# Patient Record
Sex: MALE | Race: WHITE | NOT HISPANIC OR LATINO | Employment: OTHER | URBAN - METROPOLITAN AREA
[De-identification: names, ages, dates, MRNs, and addresses within clinical notes are randomized per-mention and may not be internally consistent; named-entity substitution may affect disease eponyms.]

---

## 2017-01-24 ENCOUNTER — ALLSCRIPTS OFFICE VISIT (OUTPATIENT)
Dept: OTHER | Facility: OTHER | Age: 77
End: 2017-01-24

## 2017-08-17 ENCOUNTER — GENERIC CONVERSION - ENCOUNTER (OUTPATIENT)
Dept: OTHER | Facility: OTHER | Age: 77
End: 2017-08-17

## 2018-02-20 RX ORDER — TAMSULOSIN HYDROCHLORIDE 0.4 MG/1
CAPSULE ORAL
COMMUNITY

## 2018-02-20 RX ORDER — CLOBETASOL PROPIONATE 0.5 MG/G
OINTMENT TOPICAL 2 TIMES DAILY
COMMUNITY
Start: 2014-01-17

## 2018-02-20 RX ORDER — ALPRAZOLAM 0.25 MG/1
TABLET ORAL
COMMUNITY
End: 2018-02-21 | Stop reason: ALTCHOICE

## 2018-02-20 RX ORDER — LISINOPRIL 20 MG/1
20 TABLET ORAL
COMMUNITY

## 2018-02-20 RX ORDER — TRIAMTERENE AND HYDROCHLOROTHIAZIDE 37.5; 25 MG/1; MG/1
TABLET ORAL
COMMUNITY
Start: 2014-01-17

## 2018-02-20 RX ORDER — INDOMETHACIN 25 MG/1
CAPSULE ORAL
COMMUNITY

## 2018-02-21 ENCOUNTER — OFFICE VISIT (OUTPATIENT)
Dept: DERMATOLOGY | Facility: CLINIC | Age: 78
End: 2018-02-21
Payer: MEDICARE

## 2018-02-21 DIAGNOSIS — Z85.828 HISTORY OF SKIN CANCER: ICD-10-CM

## 2018-02-21 DIAGNOSIS — L82.1 SEBORRHEIC KERATOSIS: Primary | ICD-10-CM

## 2018-02-21 DIAGNOSIS — L57.0 ACTINIC KERATOSIS: ICD-10-CM

## 2018-02-21 DIAGNOSIS — Z13.89 SCREENING FOR SKIN CONDITION: ICD-10-CM

## 2018-02-21 PROCEDURE — 99213 OFFICE O/P EST LOW 20 MIN: CPT | Performed by: DERMATOLOGY

## 2018-02-21 PROCEDURE — 17000 DESTRUCT PREMALG LESION: CPT | Performed by: DERMATOLOGY

## 2018-02-21 PROCEDURE — 17003 DESTRUCT PREMALG LES 2-14: CPT | Performed by: DERMATOLOGY

## 2018-02-21 NOTE — PATIENT INSTRUCTIONS
Actinic Keratosis:  Patient advised lesions are precancers  These should resolve with cryosurgery if not to let us know sun block recommended  Seborrheic keratosis patient reassured these are normal growths we acquire with age no treatment needed  History of skin cancer in no recurrence nothing else atypical sunblock recommended follow-up in 6 months  Screening for dermatologic disorders nothing else of concern noted on complete exam follow-up in 6 months  Treatment with Cryotherapy    The doctor has treated your skin with nitrogen, which is 320 degrees Fahrenheit below zero  He has given the treated area "frostbite "    Stinging should subside within a few hours  You can take Tylenol for pain, if needed  Over the next few days, it is normal if the area becomes reddened, a blood blister, or swollen with fluid  If the lesion treated was near the eye - you could get a swollen eye over the next few days  Do not panic! This is all temporary, and will resolve with time  There is no special treatment - just keep the area clean  Makeup and BandAids can be used, if preferred  When the area starts to dry up and peel off, using Vaseline can help healing  It usually takes up to a month for it to heal   Some lesions are recurrent and may require repeat treatments  If a lesion has not healed in one month, please don't hesitate to contact us  If you have any further questions that are not answered here, please call us  [phone]    Thank you for allowing us to care for you

## 2018-02-21 NOTE — PROGRESS NOTES
[address] [facility] OF [address] [facility] DERMATOLOGY  [address] [address]     MRN: [id number] : 1940  Encounter: [id number]  Patient Information: Juan R Marin  Chief complaint:Six-month skin checkup    History of present illness:  69-year-old male with history of previous skin cancers as well as actinic keratosis presents for overall checkup no specific complaints noted  No past medical history on file  No past surgical history on file  Social History   History   Alcohol use Not on file     History   Drug use: Unknown     History   Smoking Status    Not on file   Smokeless Tobacco    Not on file     No family history on file  Meds/Allergies   Allergies   Allergen Reactions    Other      Other reaction(s): Unknown       Meds:  Prior to Admission medications    Medication Sig Start Date End Date Taking?  Authorizing Provider   clobetasol (TEMOVATE) 0 05 % ointment Apply topically 2 (two) times a day 14  Yes Historical Provider, MD   triamcinolone (KENALOG) 0 1 % ointment Apply topically 2 (two) times a day 14  Yes Historical Provider, MD   triamterene-hydrochlorothiazide (MAXZIDE-25) 37 5-25 mg per tablet Take by mouth 14  Yes Historical Provider, MD   cholecalciferol (VITAMIN D3) 1,000 units tablet Take by mouth    Historical Provider, MD   indomethacin (INDOCIN) 25 mg capsule Take by mouth    Historical Provider, MD   lisinopril (ZESTRIL) 20 mg tablet Take by mouth    Historical Provider, MD   tamsulosin (FLOMAX) 0 4 mg Take by mouth    Historical Provider, MD   ALPRAZolam Karilyn Meline) 0 25 mg tablet Take by mouth  18  Historical Provider, MD       Subjective:     Review of Systems:    General: negative for - chills, fatigue, fever,  weight gain or weight loss  Psychological: negative for - anxiety, behavioral disorder, concentration difficulties, decreased libido, depression, irritability, memory difficulties, mood swings, sleep disturbances or suicidal ideation  ENT: negative for - hearing difficulties , nasal congestion, nasal discharge, oral lesions, sinus pain, sneezing, sore throat  Allergy and Immunology: negative for - hives, insect bite sensitivity,  Hematological and Lymphatic: negative for - bleeding problems, blood clots,bruising, swollen lymph nodes  Endocrine: negative for - hair pattern changes, hot flashes, malaise/lethargy, mood swings, palpitations, polydipsia/polyuria, skin changes, temperature intolerance or unexpected weight change  Respiratory: negative for - cough, hemoptysis, orthopnea, shortness of breath, or wheezing  Cardiovascular: negative for - chest pain, dyspnea on exertion, edema,  Gastrointestinal: negative for - abdominal pain, nausea/vomiting  Genito-Urinary: negative for - dysuria, incontinence, irregular/heavy menses or urinary frequency/urgency  Musculoskeletal: negative for - gait disturbance, joint pain, joint stiffness, joint swelling, muscle pain, muscular weakness  Dermatological:  As in HPI  Neurological: negative for confusion, dizziness, headaches, impaired coordination/balance, memory loss, numbness/tingling, seizures, speech problems, tremors or weakness       Objective: There were no vitals taken for this visit      Physical Exam:    General Appearance:    Alert, cooperative, no distress   Head:    Normocephalic, without obvious abnormality, atraumatic           Skin:   A full skin exam was performed including scalp, head scalp, eyes, ears, nose, lips, neck, chest, axilla, abdomen, back, buttocks, bilateral upper extremities, bilateral lower extremities, hands, feet, fingers, toes, fingernails, and toenails  Scaling hyperkeratotic areas noted below normal keratotic papules with greasy stuck on appearance previous sites of skin cancer well healed without recurrence nothing else atypical noted on complete exam   Physical Exam   HENT:   Head:          Cryotherapy Procedure Note    Pre-operative Diagnosis: actinic keratosis    Plan:  1  Instructed to keep the area dry and clean thereafter  Apply petrolatum if area gets crusty  2  Recommended that the patient use acetaminophen  as needed for pain  Locations: scalp and nose    Indications: Destruction of actinic keratosis x 4    Patient informed of risks (permanent scarring, infection, light or dark discoloration, bleeding, infection, weakness, numbness and recurrence of the lesion) and benefits of the procedure and verbal informed consent obtained  The areas are treated with liquid nitrogen therapy, frozen until ice ball extended 2 mm beyond lesion, allowed to thaw, and treated again  The patient tolerated procedure well  The patient was instructed on post-op care, warned that there may be blister formation, redness and pain  Recommend OTC analgesia as needed for pain  Condition:  Stable    Complications:  none  Assessment:     1  Seborrheic keratosis     2  Actinic keratosis     3  History of skin cancer     4  Screening for skin condition           Plan:   Actinic Keratosis:  Patient advised lesions are precancers  These should resolve with cryosurgery if not to let us know sun block recommended  Seborrheic keratosis patient reassured these are normal growths we acquire with age no treatment needed  History of skin cancer in no recurrence nothing else atypical sunblock recommended follow-up in 6 months  Screening for dermatologic disorders nothing else of concern noted on complete exam follow-up in 6 months  Lidia Rene MD  2/21/2018,3:24 PM    Portions of the record may have been created with voice recognition software   Occasional wrong word or "sound a like" substitutions may have occurred due to the inherent limitations of voice recognition software   Read the chart carefully and recognize, using context, where substitutions have occurred

## 2018-09-25 ENCOUNTER — OFFICE VISIT (OUTPATIENT)
Dept: DERMATOLOGY | Facility: CLINIC | Age: 78
End: 2018-09-25
Payer: MEDICARE

## 2018-09-25 DIAGNOSIS — L82.1 SEBORRHEIC KERATOSIS: ICD-10-CM

## 2018-09-25 DIAGNOSIS — L57.0 ACTINIC KERATOSIS: ICD-10-CM

## 2018-09-25 DIAGNOSIS — I87.2 STASIS DERMATITIS OF BOTH LEGS: ICD-10-CM

## 2018-09-25 DIAGNOSIS — Z85.828 HISTORY OF SKIN CANCER: Primary | ICD-10-CM

## 2018-09-25 DIAGNOSIS — Z13.89 SCREENING FOR SKIN CONDITION: ICD-10-CM

## 2018-09-25 PROCEDURE — 99213 OFFICE O/P EST LOW 20 MIN: CPT | Performed by: DERMATOLOGY

## 2018-09-25 PROCEDURE — 17003 DESTRUCT PREMALG LES 2-14: CPT | Performed by: DERMATOLOGY

## 2018-09-25 PROCEDURE — 17000 DESTRUCT PREMALG LESION: CPT | Performed by: DERMATOLOGY

## 2018-09-25 NOTE — PROGRESS NOTES
[facility] DERMATOLOGY  [address] [place] [address]  [phone]     MRN: [id number] : 1940  Encounter: [id number]  Patient Information: Jersey Lira  Chief complaint:Six-month check up    History of present illness:  20-year-old male with previous history of skin cancer actinic keratosis stasis dermatitis presents for overall checkup no specific concerns noted needs refills on his topical steroid for his leg  History reviewed  No pertinent past medical history  History reviewed  No pertinent surgical history  Social History   History   Alcohol Use    Yes     Comment: occasion     History   Drug Use No     History   Smoking Status    Never Smoker   Smokeless Tobacco    Never Used     History reviewed  No pertinent family history  Meds/Allergies   Allergies   Allergen Reactions    Other      Other reaction(s): Unknown       Meds:  Prior to Admission medications    Medication Sig Start Date End Date Taking?  Authorizing Provider   cholecalciferol (VITAMIN D3) 1,000 units tablet Take by mouth   Yes Historical Provider, MD   clobetasol (TEMOVATE) 0 05 % ointment Apply topically 2 (two) times a day 14  Yes Historical Provider, MD   indomethacin (INDOCIN) 25 mg capsule Take by mouth   Yes Historical Provider, MD   lisinopril (ZESTRIL) 20 mg tablet Take by mouth   Yes Historical Provider, MD   tamsulosin (FLOMAX) 0 4 mg Take by mouth   Yes Historical Provider, MD   triamcinolone (KENALOG) 0 1 % ointment Apply topically 2 (two) times a day 14  Yes Historical Provider, MD   triamterene-hydrochlorothiazide (MAXZIDE-25) 37 5-25 mg per tablet Take by mouth 14  Yes Historical Provider, MD       Subjective:     Review of Systems:    General: negative for - chills, fatigue, fever,  weight gain or weight loss  Psychological: negative for - anxiety, behavioral disorder, concentration difficulties, decreased libido, depression, irritability, memory difficulties, mood swings, sleep disturbances or suicidal ideation  ENT: negative for - hearing difficulties , nasal congestion, nasal discharge, oral lesions, sinus pain, sneezing, sore throat  Allergy and Immunology: negative for - hives, insect bite sensitivity,  Hematological and Lymphatic: negative for - bleeding problems, blood clots,bruising, swollen lymph nodes  Endocrine: negative for - hair pattern changes, hot flashes, malaise/lethargy, mood swings, palpitations, polydipsia/polyuria, skin changes, temperature intolerance or unexpected weight change  Respiratory: negative for - cough, hemoptysis, orthopnea, shortness of breath, or wheezing  Cardiovascular: negative for - chest pain, dyspnea on exertion, edema,  Gastrointestinal: negative for - abdominal pain, nausea/vomiting  Genito-Urinary: negative for - dysuria, incontinence, irregular/heavy menses or urinary frequency/urgency  Musculoskeletal: negative for - gait disturbance, joint pain, joint stiffness, joint swelling, muscle pain, muscular weakness  Dermatological:  As in HPI  Neurological: negative for confusion, dizziness, headaches, impaired coordination/balance, memory loss, numbness/tingling, seizures, speech problems, tremors or weakness       Objective: There were no vitals taken for this visit      Physical Exam:    General Appearance:    Alert, cooperative, no distress   Head:    Normocephalic, without obvious abnormality, atraumatic           Skin:   A full skin exam was performed including scalp, head scalp, eyes, ears, nose, lips, neck, chest, axilla, abdomen, back, buttocks, bilateral upper extremities, bilateral lower extremities, hands, feet, fingers, toes, fingernails, and toenails  Scaling erythematous areas noted on the scalp normal keratotic papules with greasy stuck on appearance previous sites of skin cancer well healed without recurrence hyperpigmentation in the lower legs with scaling noted  Physical Exam HENT:   Head:          Cryotherapy Procedure Note    Pre-operative Diagnosis: actinic keratosis    Plan:  1  Instructed to keep the area dry and clean thereafter  Apply petrolatum if area gets crusty  2  Recommended that the patient use acetaminophen  as needed for pain  Locations:   scalp    Indications: Destruction of  Actinic keratosis x4    Patient informed of risks (permanent scarring, infection, light or dark discoloration, bleeding, infection, weakness, numbness and recurrence of the lesion) and benefits of the procedure and verbal informed consent obtained  The areas are treated with liquid nitrogen therapy, frozen until ice ball extended 2 mm beyond lesion, allowed to thaw, and treated again  The patient tolerated procedure well  The patient was instructed on post-op care, warned that there may be blister formation, redness and pain  Recommend OTC analgesia as needed for pain  Condition:  Stable    Complications:  none  Assessment:     1  History of skin cancer     2  Actinic keratosis     3  Screening for skin condition     4  Seborrheic keratosis     5  Stasis dermatitis of both legs  triamcinolone (KENALOG) 0 1 % ointment         Plan:   Actinic Keratosis:  Patient advised lesions are precancers  These should resolve with cryosurgery if not to let us know sun block recommended     stasis dermatitis advise importance of support stockings along with a topical steroid  Seborrheic keratosis patient reassured these are normal growths we acquire with age no treatment needed  History of skin cancer in no recurrence nothing else atypical sunblock recommended follow-up in 1 year  Screening for dermatologic disorders nothing else of concern noted on complete exam follow-up in 1 year      Perez Pisano MD  9/25/2018,2:23 PM    Portions of the record may have been created with voice recognition software   Occasional wrong word or "sound a like" substitutions may have occurred due to the inherent limitations of voice recognition software   Read the chart carefully and recognize, using context, where substitutions have occurred

## 2018-09-25 NOTE — PATIENT INSTRUCTIONS
Actinic Keratosis:  Patient advised lesions are precancers  These should resolve with cryosurgery if not to let us know sun block recommended  stasis dermatitis advise importance of support stockings along with a topical steroid  Seborrheic keratosis patient reassured these are normal growths we acquire with age no treatment needed  History of skin cancer in no recurrence nothing else atypical sunblock recommended follow-up in 1 year  Screening for dermatologic disorders nothing else of concern noted on complete exam follow-up in 1 year  Treatment with Cryotherapy    The doctor has treated your skin with nitrogen, which is 320 degrees Fahrenheit below zero  He has given the treated area "frostbite "    Stinging should subside within a few hours  You can take Tylenol for pain, if needed  Over the next few days, it is normal if the area becomes reddened, a blood blister, or swollen with fluid  If the lesion treated was near the eye - you could get a swollen eye over the next few days  Do not panic! This is all temporary, and will resolve with time  There is no special treatment - just keep the area clean  Makeup and BandAids can be used, if preferred  When the area starts to dry up and peel off, using Vaseline can help healing  It usually takes up to a month for it to heal   Some lesions are recurrent and may require repeat treatments  If a lesion has not healed in one month, please don't hesitate to contact us  If you have any further questions that are not answered here, please call us  [phone]    Thank you for allowing us to care for you

## 2019-07-22 DIAGNOSIS — I87.2 STASIS DERMATITIS OF BOTH LEGS: ICD-10-CM

## 2019-10-08 ENCOUNTER — OFFICE VISIT (OUTPATIENT)
Dept: DERMATOLOGY | Facility: CLINIC | Age: 79
End: 2019-10-08
Payer: MEDICARE

## 2019-10-08 DIAGNOSIS — L57.0 ACTINIC KERATOSIS: ICD-10-CM

## 2019-10-08 DIAGNOSIS — Z85.828 HISTORY OF SKIN CANCER: ICD-10-CM

## 2019-10-08 DIAGNOSIS — L82.1 SEBORRHEIC KERATOSIS: Primary | ICD-10-CM

## 2019-10-08 DIAGNOSIS — Z13.89 SCREENING FOR SKIN CONDITION: ICD-10-CM

## 2019-10-08 PROCEDURE — 99213 OFFICE O/P EST LOW 20 MIN: CPT | Performed by: DERMATOLOGY

## 2019-10-08 PROCEDURE — 17003 DESTRUCT PREMALG LES 2-14: CPT | Performed by: DERMATOLOGY

## 2019-10-08 PROCEDURE — 17000 DESTRUCT PREMALG LESION: CPT | Performed by: DERMATOLOGY

## 2019-10-08 NOTE — PATIENT INSTRUCTIONS
Actinic Keratosis:  Patient advised lesions are precancers  These should resolve with cryosurgery if not to let us know sun block recommended  Seborrheic keratosis patient reassured these are normal growths we acquire with age no treatment needed  History of skin cancer in no recurrence nothing else atypical sunblock recommended follow-up in 12months  Screening for dermatologic disorders nothing else of concern noted on complete exam follow-up in 12 months  Treatment with Cryotherapy    The doctor has treated your skin with nitrogen, which is 320 degrees Fahrenheit below zero  He has given the treated area "frostbite "    Stinging should subside within a few hours  You can take Tylenol for pain, if needed  Over the next few days, it is normal if the area becomes reddened, a blood blister, or swollen with fluid  If the lesion treated was near the eye - you could get a swollen eye over the next few days  Do not panic! This is all temporary, and will resolve with time  There is no special treatment - just keep the area clean  Makeup and BandAids can be used, if preferred  When the area starts to dry up and peel off, using Vaseline can help healing  It usually takes up to a month for it to heal   Some lesions are recurrent and may require repeat treatments  If a lesion has not healed in one month, please don't hesitate to contact us  If you have any further questions that are not answered here, please call us  [phone]    Thank you for allowing us to care for you

## 2019-10-08 NOTE — PROGRESS NOTES
[address] [facility] DERMATOLOGY  [address] [place] [address]  [phone]  [phone]     MRN: [id number] : 1940  Encounter: [id number]  Patient Information: Cristine Carrion  Chief complaint:  Yearly checkup  History of present illness:  21-year-old male presents for overall checkup previous history of skin cancer continue have problems with the scalp no other concerns noted  History reviewed  No pertinent past medical history  History reviewed  No pertinent surgical history  Social History   Social History     Substance and Sexual Activity   Alcohol Use Yes    Comment: occasion     Social History     Substance and Sexual Activity   Drug Use No     Social History     Tobacco Use   Smoking Status Never Smoker   Smokeless Tobacco Never Used     History reviewed  No pertinent family history  Meds/Allergies   No Known Allergies    Meds:  Prior to Admission medications    Medication Sig Start Date End Date Taking?  Authorizing Provider   cholecalciferol (VITAMIN D3) 1,000 units tablet Take by mouth   Yes Historical Provider, MD   clobetasol (TEMOVATE) 0 05 % ointment Apply topically 2 (two) times a day 14  Yes Historical Provider, MD   lisinopril (ZESTRIL) 20 mg tablet Take 20 mg by mouth    Yes Historical Provider, MD   tamsulosin (FLOMAX) 0 4 mg Take by mouth   Yes Historical Provider, MD   triamcinolone (KENALOG) 0 1 % ointment Apply topically 2 (two) times a day To legs as needed 19  Yes Millicent Cid MD   indomethacin (INDOCIN) 25 mg capsule Take by mouth    Historical Provider, MD   triamterene-hydrochlorothiazide (MAXZIDE-25) 37 5-25 mg per tablet Take by mouth 14   Historical Provider, MD       Subjective:     Review of Systems:    General: negative for - chills, fatigue, fever,  weight gain or weight loss  Psychological: negative for - anxiety, behavioral disorder, concentration difficulties, decreased libido, depression, irritability, memory difficulties, mood swings, sleep disturbances or suicidal ideation  ENT: negative for - hearing difficulties , nasal congestion, nasal discharge, oral lesions, sinus pain, sneezing, sore throat  Allergy and Immunology: negative for - hives, insect bite sensitivity,  Hematological and Lymphatic: negative for - bleeding problems, blood clots,bruising, swollen lymph nodes  Endocrine: negative for - hair pattern changes, hot flashes, malaise/lethargy, mood swings, palpitations, polydipsia/polyuria, skin changes, temperature intolerance or unexpected weight change  Respiratory: negative for - cough, hemoptysis, orthopnea, shortness of breath, or wheezing  Cardiovascular: negative for - chest pain, dyspnea on exertion, edema,  Gastrointestinal: negative for - abdominal pain, nausea/vomiting  Genito-Urinary: negative for - dysuria, incontinence, irregular/heavy menses or urinary frequency/urgency  Musculoskeletal: negative for - gait disturbance, joint pain, joint stiffness, joint swelling, muscle pain, muscular weakness  Dermatological:  As in HPI  Neurological: negative for confusion, dizziness, headaches, impaired coordination/balance, memory loss, numbness/tingling, seizures, speech problems, tremors or weakness       Objective: There were no vitals taken for this visit      Physical Exam:    General Appearance:    Alert, cooperative, no distress   Head:    Normocephalic, without obvious abnormality, atraumatic           Skin:   A full skin exam was performed including scalp, head scalp, eyes, ears, nose, lips, neck, chest, axilla, abdomen, back, buttocks, bilateral upper extremities, bilateral lower extremities, hands, feet, fingers, toes, fingernails, and toenails scaling erythematous areas noted below normal keratotic papules with greasy stuck on appearance previous sites skin cancer well healed without recurrence  Physical Exam   HENT:   Head:          Cryotherapy Procedure Note    Pre-operative Diagnosis: actinic keratosis    Plan:  1  Instructed to keep the area dry and clean thereafter  Apply petrolatum if area gets crusty  2  Recommended that the patient use acetaminophen  as needed for pain  Locations:  Scalp    Indications: Destruction of actinic keratosis x4  Patient informed of risks (permanent scarring, infection, light or dark discoloration, bleeding, infection, weakness, numbness and recurrence of the lesion) and benefits of the procedure and verbal informed consent obtained  The areas are treated with liquid nitrogen therapy, frozen until ice ball extended 2 mm beyond lesion, allowed to thaw, and treated again  The patient tolerated procedure well  The patient was instructed on post-op care, warned that there may be blister formation, redness and pain  Recommend OTC analgesia as needed for pain  Condition:  Stable    Complications:  none  Assessment:     1  Seborrheic keratosis     2  Screening for skin condition     3  History of skin cancer     4  Actinic keratosis           Plan:   Actinic Keratosis:  Patient advised lesions are precancers  These should resolve with cryosurgery if not to let us know sun block recommended  Seborrheic keratosis patient reassured these are normal growths we acquire with age no treatment needed  History of skin cancer in no recurrence nothing else atypical sunblock recommended follow-up in 12months  Screening for dermatologic disorders nothing else of concern noted on complete exam follow-up in 12 months    Leonor Enriquez MD  10/8/2019,2:40 PM    Portions of the record may have been created with voice recognition software   Occasional wrong word or "sound a like" substitutions may have occurred due to the inherent limitations of voice recognition software   Read the chart carefully and recognize, using context, where substitutions have occurred

## 2020-05-04 DIAGNOSIS — I87.2 STASIS DERMATITIS OF BOTH LEGS: ICD-10-CM

## 2021-02-25 DIAGNOSIS — I87.2 STASIS DERMATITIS OF BOTH LEGS: ICD-10-CM

## 2021-04-21 ENCOUNTER — OFFICE VISIT (OUTPATIENT)
Dept: DERMATOLOGY | Facility: CLINIC | Age: 81
End: 2021-04-21
Payer: COMMERCIAL

## 2021-04-21 VITALS — TEMPERATURE: 97.2 F

## 2021-04-21 DIAGNOSIS — D48.9 NEOPLASM OF UNCERTAIN BEHAVIOR: ICD-10-CM

## 2021-04-21 DIAGNOSIS — Z85.828 HISTORY OF SKIN CANCER: ICD-10-CM

## 2021-04-21 DIAGNOSIS — L82.1 SEBORRHEIC KERATOSIS: ICD-10-CM

## 2021-04-21 DIAGNOSIS — L98.9 UNKNOWN SKIN LESION: Primary | ICD-10-CM

## 2021-04-21 DIAGNOSIS — Z13.89 SCREENING FOR SKIN CONDITION: ICD-10-CM

## 2021-04-21 DIAGNOSIS — I87.2 STASIS DERMATITIS OF BOTH LEGS: ICD-10-CM

## 2021-04-21 PROCEDURE — 88305 TISSUE EXAM BY PATHOLOGIST: CPT | Performed by: STUDENT IN AN ORGANIZED HEALTH CARE EDUCATION/TRAINING PROGRAM

## 2021-04-21 PROCEDURE — 99213 OFFICE O/P EST LOW 20 MIN: CPT | Performed by: DERMATOLOGY

## 2021-04-21 PROCEDURE — 11102 TANGNTL BX SKIN SINGLE LES: CPT | Performed by: DERMATOLOGY

## 2021-04-21 NOTE — PROGRESS NOTES
[address]  [address] [facility]  [place] [address]  [phone]  [phone]     MRN: [id number] : 1940  Encounter: [id number]  Patient Information: Anika Dixon  Chief complaint: yearly check up    History of present illness:  67-year-old male presents for overall skin check history of stasis dermatitis notes of rash that is present on the corner of his eye has been using the triamcinolone on that area couple times a week  No other concerns noted  History reviewed  No pertinent past medical history  History reviewed  No pertinent surgical history  Social History   Social History     Substance and Sexual Activity   Alcohol Use Yes    Comment: occasion     Social History     Substance and Sexual Activity   Drug Use No     Social History     Tobacco Use   Smoking Status Never Smoker   Smokeless Tobacco Never Used     History reviewed  No pertinent family history  Meds/Allergies   No Known Allergies    Meds:  Prior to Admission medications    Medication Sig Start Date End Date Taking?  Authorizing Provider   cholecalciferol (VITAMIN D3) 1,000 units tablet Take by mouth   Yes Historical Provider, MD   clobetasol (TEMOVATE) 0 05 % ointment Apply topically 2 (two) times a day 14  Yes Historical Provider, MD   indomethacin (INDOCIN) 25 mg capsule Take by mouth   Yes Historical Provider, MD   lisinopril (ZESTRIL) 20 mg tablet Take 20 mg by mouth    Yes Historical Provider, MD   tamsulosin (FLOMAX) 0 4 mg Take by mouth   Yes Historical Provider, MD   triamcinolone (KENALOG) 0 1 % ointment apply topically twice a day TO LEGS AS NEEDED  21  Yes Lou Rodriguez MD   triamterene-hydrochlorothiazide [facility]) 37 5-25 mg per tablet Take by mouth 14  Yes Historical Provider, MD       Subjective:     Review of Systems:    General: negative for - chills, fatigue, fever,  weight gain or weight loss  Psychological: negative for - anxiety, behavioral disorder, concentration difficulties, decreased libido, depression, irritability, memory difficulties, mood swings, sleep disturbances or suicidal ideation  ENT: negative for - hearing difficulties , nasal congestion, nasal discharge, oral lesions, sinus pain, sneezing, sore throat  Allergy and Immunology: negative for - hives, insect bite sensitivity,  Hematological and Lymphatic: negative for - bleeding problems, blood clots,bruising, swollen lymph nodes  Endocrine: negative for - hair pattern changes, hot flashes, malaise/lethargy, mood swings, palpitations, polydipsia/polyuria, skin changes, temperature intolerance or unexpected weight change  Respiratory: negative for - cough, hemoptysis, orthopnea, shortness of breath, or wheezing  Cardiovascular: negative for - chest pain, dyspnea on exertion, edema,  Gastrointestinal: negative for - abdominal pain, nausea/vomiting  Genito-Urinary: negative for - dysuria, incontinence, irregular/heavy menses or urinary frequency/urgency  Musculoskeletal: negative for - gait disturbance, joint pain, joint stiffness, joint swelling, muscle pain, muscular weakness  Dermatological:  As in HPI  Neurological: negative for confusion, dizziness, headaches, impaired coordination/balance, memory loss, numbness/tingling, seizures, speech problems, tremors or weakness       Objective:   Temp (!) 97 2 °F (36 2 °C) (Temporal)     Physical Exam:    General Appearance:    Alert, cooperative, no distress   Head:    Normocephalic, without obvious abnormality, atraumatic           Skin:   A full skin exam was performed including scalp, head scalp, eyes, ears, nose, lips, neck, chest, axilla, abdomen, back, buttocks, bilateral upper extremities, bilateral lower extremities, hands, feet, fingers, toes, fingernails, and toenails 23 mm scaling erythematous patch noted on the left infraorbital area into the nasal bridge normal keratotic papules greasy stuck on appearance previous sites skin cancer well healed without recurrence some scaling patches noted on the back with his hyperpigmentation and swelling noted on both legs no active dermatitis on the legs noted nothing else of concern noted exam    Shave Biopsy Procedure Note    Pre-operative Diagnosis:  Basal cell carcinoma    Plan:  1  Instructed to keep the wound dry and covered for 24 and clean thereafter  2  Warning signs of infection were reviewed  3  Recommended that the patient use OTC acetaminophen as needed for pain  4  Return  Pending results of biopsy(ies)    Locations:  Infraorbital area    Indications:  Suspicious lesion    Anesthesia: Lidocaine 1% with epinephrine without added sodium bicarbonate    Procedure Details     Patient informed of the risks (including bleeding and infection) and benefits of the   procedure and Verbal informed consent obtained  The lesion and surrounding area were given a sterile prep using alcohol and draped in the usual sterile fashion  A Blue blade razor was used to obtain a specimen  Hemostasis achieved with aluminum chloride  Petrolatum and a sterile dressing applied  The specimen was sent for pathologic examination  The patient tolerated the procedure(s) well  Complications:  none  Assessment:     1  Unknown skin lesion     2  Seborrheic keratosis     3  Screening for skin condition     4  History of skin cancer     5   Stasis dermatitis of both legs           Plan:   Skin lesion possible basal cell carcinoma because of its large size would require Mohs surgery however patient will be planning to go [place] July [place] daughter's wedding will need to consider if we can get this healed up before then  Stasis dermatitis appears stable continue with the topical steroid as needed  Seborrheic keratosis patient reassured these are normal growths we acquire with age no treatment needed  History of skin cancer in no recurrence nothing else atypical sunblock recommended follow-up in 1 year  Screening for dermatologic disorders nothing else of concern noted on complete exam follow-up in 1 year      Sameer Lehman MD  4/21/2021,2:17 PM    Portions of the record may have been created with voice recognition software   Occasional wrong word or "sound a like" substitutions may have occurred due to the inherent limitations of voice recognition software   Read the chart carefully and recognize, using context, where substitutions have occurred

## 2021-04-21 NOTE — PATIENT INSTRUCTIONS
Skin lesion possible basal cell carcinoma because of its large size would require Mohs surgery however patient will be planning to go [place] daughter[place] will need to consider if we can get this healed up before then  Stasis dermatitis appears stable continue with the topical steroid as needed  Seborrheic keratosis patient reassured these are normal growths we acquire with age no treatment needed  History of skin cancer in no recurrence nothing else atypical sunblock recommended follow-up in 1 year  Screening for dermatologic disorders nothing else of concern noted on complete exam follow-up in 1 year  Wound care instructions given to patient

## 2021-04-27 DIAGNOSIS — C44.329 SQUAMOUS CELL CANCER OF SKIN OF LEFT CHEEK: Primary | ICD-10-CM

## 2021-04-27 DIAGNOSIS — C44.329 SQUAMOUS CELL CANCER OF SKIN OF RIGHT CHEEK: ICD-10-CM

## 2021-07-07 ENCOUNTER — TELEPHONE (OUTPATIENT)
Dept: DERMATOLOGY | Facility: CLINIC | Age: 81
End: 2021-07-07

## 2021-07-07 NOTE — TELEPHONE ENCOUNTER
Telephone call to pt regarding coordinating MOHS with Dr Drema Osgood plastics closure  Dates available are 8/25 & 8/26  Unable to leave message due to mailbox being full  Will try back at a later date

## 2021-07-08 NOTE — TELEPHONE ENCOUNTER
Telephone call to pt  LVM regarding scheduling MOHS with Dr Gayla Fishman closure  Advised to return my call at earliest convenience

## 2021-07-09 NOTE — TELEPHONE ENCOUNTER
Telephone call from pt regarding coordinating MOHS with plastics  Pt needs to check with daughters schedule then will call me to confirm if dates 8/25 & 8/26 work or not

## 2021-08-17 NOTE — TELEPHONE ENCOUNTER
Called and spoke with patient  He is unable to get Mohs until after September 20th  Patient has to have a procedure on his bladder and has to wear a catheter for a month  He said he will reach out when he is cleared

## 2021-12-08 ENCOUNTER — TELEPHONE (OUTPATIENT)
Dept: DERMATOLOGY | Facility: CLINIC | Age: 81
End: 2021-12-08

## 2021-12-08 ENCOUNTER — PROCEDURE VISIT (OUTPATIENT)
Dept: DERMATOLOGY | Facility: CLINIC | Age: 81
End: 2021-12-08
Payer: COMMERCIAL

## 2021-12-08 VITALS
WEIGHT: 227.4 LBS | DIASTOLIC BLOOD PRESSURE: 82 MMHG | SYSTOLIC BLOOD PRESSURE: 138 MMHG | HEART RATE: 91 BPM | TEMPERATURE: 97.2 F | RESPIRATION RATE: 16 BRPM

## 2021-12-08 DIAGNOSIS — C44.92 SCC (SQUAMOUS CELL CARCINOMA): ICD-10-CM

## 2021-12-08 DIAGNOSIS — C44.329 SQUAMOUS CELL CANCER OF SKIN OF LEFT CHEEK: ICD-10-CM

## 2021-12-08 PROCEDURE — 17311 MOHS 1 STAGE H/N/HF/G: CPT | Performed by: DERMATOLOGY

## 2021-12-08 PROCEDURE — 17312 MOHS ADDL STAGE: CPT | Performed by: DERMATOLOGY

## 2021-12-09 ENCOUNTER — TELEPHONE (OUTPATIENT)
Dept: DERMATOLOGY | Facility: CLINIC | Age: 81
End: 2021-12-09

## 2021-12-14 ENCOUNTER — TELEPHONE (OUTPATIENT)
Dept: HEMATOLOGY ONCOLOGY | Facility: CLINIC | Age: 81
End: 2021-12-14

## 2021-12-28 ENCOUNTER — TELEPHONE (OUTPATIENT)
Dept: HEMATOLOGY ONCOLOGY | Facility: CLINIC | Age: 81
End: 2021-12-28

## 2022-01-07 DIAGNOSIS — I87.2 STASIS DERMATITIS OF BOTH LEGS: ICD-10-CM

## 2022-03-14 ENCOUNTER — OFFICE VISIT (OUTPATIENT)
Dept: DERMATOLOGY | Facility: CLINIC | Age: 82
End: 2022-03-14
Payer: COMMERCIAL

## 2022-03-14 DIAGNOSIS — Z51.89 VISIT FOR WOUND CHECK: Primary | ICD-10-CM

## 2022-03-14 PROCEDURE — 99212 OFFICE O/P EST SF 10 MIN: CPT | Performed by: DERMATOLOGY

## 2022-03-14 NOTE — PROGRESS NOTES
WOUND CHECK    Physical Exam:   Anatomic Location Affected:  Left infraorbital area   Description of wound: Well healed graft with slight edema under left eye   Closure Type: Referred to plastics - Dr Kacey Antony     Additional History of Present Condition:  S/P MOHS on 12/08/2021  Patient had repair done by Dr Kacey Antony  Patient satisfied with overall take of graft although has a slight bump underneath left eye which he states drainage comes out at times  Patient also states clear fluid drains out of left corner of left eye  Patient had follow-up appointment with Dr Kacey Antony 2 months ago at which point Dr Kacey Antony stated everything was looking fine  Assessment and Plan:  Based on a thorough discussion of this condition and the management approach to it (including a comprehensive discussion of the known risks, side effects and potential benefits of treatment), the patient (family) agrees to implement the following specific plan:   Advised clear drainage out of left eye is normal  Would not do any intervention at this point since MOHS was only done early December   Patient has follow up appointment with Dr Kacey Antony on 04/07/2022  Patient returns for wound check s/p mohs and oculoplastics repair  He states he will get some drainage from his lateral eyelid and medial cheek  I discussed that cysts can form in scars  At this time there is no signs of recurrence but he was instructed to see hematology oncology due to the aggressive nature of his SCC with large size and perineural invasion  He has yet to see hematology oncology despite this  He will see Dr Kacey Antony next month and discussed that he scar is healing well and will continue to heal months from now  He is instructed to follow up with dermatology for a FBSE given his SCC history      Scribe Attestation    I,:  Janice Aquino RN am acting as a scribe while in the presence of the attending physician :       I,:  Jana Auguste MD personally performed the services described in this documentation    as scribed in my presence :

## 2022-06-09 PROCEDURE — 88305 TISSUE EXAM BY PATHOLOGIST: CPT | Performed by: PATHOLOGY

## 2022-06-09 PROCEDURE — 88341 IMHCHEM/IMCYTCHM EA ADD ANTB: CPT | Performed by: PATHOLOGY

## 2022-06-09 PROCEDURE — 88342 IMHCHEM/IMCYTCHM 1ST ANTB: CPT | Performed by: PATHOLOGY

## 2022-06-10 ENCOUNTER — LAB REQUISITION (OUTPATIENT)
Dept: LAB | Facility: HOSPITAL | Age: 82
End: 2022-06-10
Payer: COMMERCIAL

## 2022-06-10 DIAGNOSIS — H02.115 CICATRICIAL ECTROPION OF LEFT LOWER EYELID: ICD-10-CM

## 2022-07-25 ENCOUNTER — HOSPITAL ENCOUNTER (OUTPATIENT)
Dept: CT IMAGING | Facility: CLINIC | Age: 82
Discharge: HOME | End: 2022-07-25
Payer: COMMERCIAL

## 2022-07-25 DIAGNOSIS — C44.329 SQUAMOUS CELL CARCINOMA OF SKIN OF OTHER PARTS OF FACE: ICD-10-CM

## 2022-07-25 DIAGNOSIS — C44.1291 SQUAMOUS CELL CARCINOMA OF SKIN OF LEFT UPPER EYELID, INCLUDING CANTHUS: ICD-10-CM

## 2022-07-25 PROCEDURE — G1004 CDSM NDSC: HCPCS

## 2022-07-25 PROCEDURE — 70487 CT MAXILLOFACIAL W/DYE: CPT

## 2022-07-25 PROCEDURE — 70491 CT SOFT TISSUE NECK W/DYE: CPT

## 2022-07-25 RX ADMIN — IOHEXOL 75 ML: 350 INJECTION, SOLUTION INTRAVENOUS at 10:49

## 2022-08-02 ENCOUNTER — OFFICE VISIT (OUTPATIENT)
Dept: DERMATOLOGY | Facility: CLINIC | Age: 82
End: 2022-08-02
Payer: COMMERCIAL

## 2022-08-02 DIAGNOSIS — Z51.89 VISIT FOR WOUND CHECK: Primary | ICD-10-CM

## 2022-08-02 DIAGNOSIS — C44.92 RECURRENT SQUAMOUS CELL CARCINOMA OF SKIN: ICD-10-CM

## 2022-08-02 PROCEDURE — 99213 OFFICE O/P EST LOW 20 MIN: CPT | Performed by: DERMATOLOGY

## 2022-08-05 NOTE — PROGRESS NOTES
WOUND CHECK MOHS     Physical Exam:   Anatomic Location Affected:  Left infraorbital area    Description of wound: well healed graft with tear duct drainage    Closure Type: Dr soto performed closure   Case Report   Surgical Pathology Report                         Case: [id number]                                    Authorizing Provider: Janes Ring MD          Collected:           06/09/2022 0000               Ordering Location:     [address]      Received:            06/10/2022 [address] [place] [address] Specialty                                                                                   Laboratory                                                                    Pathologist:           Kaylyn Torres MD                                                              Specimen:    Skin, Other, left upper lid margin lesion                                                  Final Diagnosis   A  Skin, left upper lid margin, biopsy:  Dermal atypical squamous proliferation with prominent procedural artifact  See note         Note:  The lesion exhibits cytologic atypia with prominent crushing artifact, and is most consistent with squamous cell carcinoma       Immunohistochemical stains for SOX10, HMB45, pancytokeratin AE1/AE3, EMA, BerEp4 and MOC31 performed with adequate controls support the findings  Deeper levels were examined  CT FACIAL SOFT TISSUES WITH INTRAVENOUS CONTRAST     INDICATION:   B75 [id number]: Squamous cell carcinoma of skin of left upper eyelid, including canthus  C44 329: Squamous cell carcinoma of skin of other parts of face  History of MOHS and skin graft reconstruction       COMPARISON: Concurrent CT neck  No prior study for comparison     TECHNIQUE:  Axial images through the facial soft tissues were performed  Reformatted images were created in multiple planes        Radiation dose length product (DLP) for this visit:  0 mGy-cm     This examination, like all CT scans performed in the [facility], was performed utilizing techniques to minimize radiation dose exposure, including the use of iterative   reconstruction and automated exposure control      IV Contrast:  75 mL of iohexol (OMNIPAQUE)     IMAGE QUALITY:  Diagnostic      FINDINGS:      SOFT TISSUES: Soft tissue thickening in the left medial canthal region extending anteromedially along the frontal process of the maxilla measuring approximately 2 x 0 7 cm  Small hypodense component at the posterior margin  Findings could represent   posttreatment change but residual/recurrent neoplasm cannot be excluded  Left extraocular muscles are otherwise unremarkable  Right orbit is unremarkable      DENTITION: Dental caries and radicular cysts      FACIAL BONES: There is no facial bone fracture identified  No discrete lytic or blastic lesion  No destructive lesions      ORBITS: Soft tissue in the left medial canthal region as described  Posterior aspect of the soft tissue is inseparable from the anterior inferior margin of the left superior oblique muscle which is otherwise unremarkable  Left orbit is otherwise   unremarkable       Right orbit is unremarkable      SINUSES: Minimal maxillary sinus mucosal thickening      IMPRESSION:     Soft tissue thickening in the left medial canthal region that may represent posttreatment change but residual/recurrent disease is not excluded       The study was marked in EPIC for significant notification          Additional History of Present Condition:  Patient states he was unable to see Cristian Ferreira for wound check since we was not in the office  Patient states he continues to have drainage from the corner of his eye  Patient had CT scan done due to the results of the biopsy that was done on 06/09/20220 which showed dermal atypical squamous proliferation       Assessment and Plan:  Based on a thorough discussion of this condition and the management approach to it (including a comprehensive discussion of the known risks, side effects and potential benefits of treatment), the patient (family) agrees to implement the following specific plan:   It was discussed with patient that he does need another MOHS procedure    Will discuss with Dr Mckeon weather sending patient to [address] [place] [address] would be his best option      08/04/2022  It was confirmed patient will be going to [address] [place] [address] for MOHS, records will be faxed so patient can be scheduled      Scribe Attestation    I,:  Jak Nation am acting as a scribe while in the presence of the attending physician :       I,:  Juan Serrano MD personally performed the services described in this documentation    as scribed in my presence :

## 2022-09-21 ENCOUNTER — OFFICE VISIT (OUTPATIENT)
Dept: DERMATOLOGY | Facility: CLINIC | Age: 82
End: 2022-09-21
Payer: COMMERCIAL

## 2022-09-21 VITALS — WEIGHT: 227 LBS | HEIGHT: 72 IN | BODY MASS INDEX: 30.75 KG/M2

## 2022-09-21 DIAGNOSIS — I87.2 STASIS DERMATITIS OF BOTH LEGS: ICD-10-CM

## 2022-09-21 DIAGNOSIS — L57.0 ACTINIC KERATOSIS: ICD-10-CM

## 2022-09-21 DIAGNOSIS — Z13.89 SCREENING FOR SKIN CONDITION: ICD-10-CM

## 2022-09-21 DIAGNOSIS — C44.92 RECURRENT SQUAMOUS CELL CARCINOMA OF SKIN: Primary | ICD-10-CM

## 2022-09-21 DIAGNOSIS — L82.1 SEBORRHEIC KERATOSIS: ICD-10-CM

## 2022-09-21 DIAGNOSIS — Z85.828 HISTORY OF SKIN CANCER: ICD-10-CM

## 2022-09-21 PROCEDURE — 99214 OFFICE O/P EST MOD 30 MIN: CPT | Performed by: DERMATOLOGY

## 2022-09-21 NOTE — PATIENT INSTRUCTIONS
Recurrence squamous cell patient hopefully will get good results with his repeat procedure on did side not to intervene with a hypertrophic actinic at this time with his surgery planned for October 10th    In addition on will have patient come back in 3 months to re-evaluate  Stasis dermatitis will refill his triamcinolone to control this process  Seborrheic keratosis patient reassured these are normal growths we acquire with age no treatment needed  History of skin cancer in no recurrence nothing else atypical sunblock recommended follow-up in 3 months  Screening for dermatologic disorders nothing else of concern noted on complete exam follow-up in 3 months

## 2022-09-21 NOTE — PROGRESS NOTES
[address]  [address] [facility] [address]  [phone]  [phone]     MRN: [id number] : 1940  Encounter: [id number]  Patient Information: Martha Moore  Chief complaint:  Skin Check    History of present illness:  81-year-old male with previous noted squamous cell carcinoma of the left infraorbital area with Mohs surgery performed with recurrence noted by Dr Kelly Diaz  Patient will be seen by Dr Dank Cruz at [facility] for repeat Mohs and planned repair  No other concerns noted except some lesions scalp and continued problems with the stasis dermatitis noted previously  Past Medical History:   Diagnosis Date    Squamous cell skin cancer 2021    SCC- infraorbital area     Past Surgical History:   Procedure Laterality Date    MOHS SURGERY Left 2021    SCC- Left infraorbital area, ben     Social History   Social History     Substance and Sexual Activity   Alcohol Use Yes    Comment: occasion     Social History     Substance and Sexual Activity   Drug Use No     Social History     Tobacco Use   Smoking Status Never Smoker   Smokeless Tobacco Never Used     No family history on file  Meds/Allergies   No Known Allergies    Meds:  Prior to Admission medications    Medication Sig Start Date End Date Taking?  Authorizing Provider   cholecalciferol (VITAMIN D3) 1,000 units tablet Take by mouth   Yes Historical Provider, MD   clobetasol (TEMOVATE) 0 05 % ointment Apply topically 2 (two) times a day 14  Yes Historical Provider, MD   indomethacin (INDOCIN) 25 mg capsule Take by mouth   Yes Historical Provider, MD   lisinopril (ZESTRIL) 20 mg tablet Take 20 mg by mouth    Yes Historical Provider, MD   tamsulosin (FLOMAX) 0 4 mg Take by mouth   Yes Historical Provider, MD   triamcinolone (KENALOG) 0 1 % ointment APPLY TOPICALLY TWICE A DAY TO LEGS AS NEEDED 22  Yes Sravan Jain MD triamterene-hydrochlorothiazide (MAXZIDE-25) 37 5-25 mg per tablet Take by mouth 1/17/14  Yes Historical Provider, MD       Subjective:     Review of Systems:    General: negative for - chills, fatigue, fever,  weight gain or weight loss  Psychological: negative for - anxiety, behavioral disorder, concentration difficulties, decreased libido, depression, irritability, memory difficulties, mood swings, sleep disturbances or suicidal ideation  ENT: negative for - hearing difficulties , nasal congestion, nasal discharge, oral lesions, sinus pain, sneezing, sore throat  Allergy and Immunology: negative for - hives, insect bite sensitivity,  Hematological and Lymphatic: negative for - bleeding problems, blood clots,bruising, swollen lymph nodes  Endocrine: negative for - hair pattern changes, hot flashes, malaise/lethargy, mood swings, palpitations, polydipsia/polyuria, skin changes, temperature intolerance or unexpected weight change  Respiratory: negative for - cough, hemoptysis, orthopnea, shortness of breath, or wheezing  Cardiovascular: negative for - chest pain, dyspnea on exertion, edema,  Gastrointestinal: negative for - abdominal pain, nausea/vomiting  Genito-Urinary: negative for - dysuria, incontinence, irregular/heavy menses or urinary frequency/urgency  Musculoskeletal: negative for - gait disturbance, joint pain, joint stiffness, joint swelling, muscle pain, muscular weakness  Dermatological:  As in HPI  Neurological: negative for confusion, dizziness, headaches, impaired coordination/balance, memory loss, numbness/tingling, seizures, speech problems, tremors or weakness       Objective:   Ht 6' (1 829 m)   Wt 103 kg (227 lb)   BMI 30 79 kg/m²     Physical Exam:    General Appearance:    Alert, cooperative, no distress   Head:    Normocephalic, without obvious abnormality, atraumatic           Skin:   A full skin exam was performed including scalp, head scalp, eyes, ears, nose, lips, neck, chest, axilla, abdomen, back, buttocks, bilateral upper extremities, bilateral lower extremities, hands, feet, fingers, toes, fingernails, and toenails hyperkeratotic areas noted on the scalp times 5 previous site of excision on the left infra orbital area will involvement still noted  Inflammation swelling with discoloration noted on the lower extremities normal keratotic papules with greasy stuck on appearance nothing else atypical noted on exam     Assessment:     1  Recurrent squamous cell carcinoma of skin     2  Actinic keratosis     3  Seborrheic keratosis     4  Stasis dermatitis of both legs     5  Screening for skin condition     6  History of skin cancer           Plan:   Recurrence squamous cell patient hopefully will get good results with his repeat procedure on did side not to intervene with a hypertrophic actinic at this time with his surgery planned for October 10th  In addition on will have patient come back in 3 months to re-evaluate  Stasis dermatitis will refill his triamcinolone to control this process  Seborrheic keratosis patient reassured these are normal growths we acquire with age no treatment needed  History of skin cancer in no recurrence nothing else atypical sunblock recommended follow-up in 3 months  Screening for dermatologic disorders nothing else of concern noted on complete exam follow-up in 3 months    Opal Yun MD  9/21/2022,3:19 PM    Portions of the record may have been created with voice recognition software   Occasional wrong word or "sound a like" substitutions may have occurred due to the inherent limitations of voice recognition software   Read the chart carefully and recognize, using context, where substitutions have occurred

## 2022-11-28 ENCOUNTER — TELEPHONE (OUTPATIENT)
Dept: DERMATOLOGY | Facility: CLINIC | Age: 82
End: 2022-11-28

## 2023-07-24 ENCOUNTER — TELEPHONE (OUTPATIENT)
Dept: DERMATOLOGY | Facility: CLINIC | Age: 83
End: 2023-07-24

## 2023-07-24 DIAGNOSIS — I87.2 STASIS DERMATITIS OF BOTH LEGS: ICD-10-CM

## 2023-07-24 RX ORDER — TRIAMCINOLONE ACETONIDE 1 MG/G
OINTMENT TOPICAL
Qty: 80 G | Refills: 3 | Status: SHIPPED | OUTPATIENT
Start: 2023-07-24 | End: 2023-08-10 | Stop reason: SDUPTHER

## 2023-07-24 NOTE — TELEPHONE ENCOUNTER
Rec'd call from patients daughter Moira Diaz [phone] stating that her father has been staying down with her - due to skin cancer treatments. He will be in [facility] week of 8/7 - she was trying to schedule a "regular" derm appt with Dr. Swetha Rausch. I informed her at this time - nothing available but I would call her with any cancellations. If not, she states, patient will be seen next Spring when hopefully he's residing again in [facility]. Requesting a refill of KENALOG be sent to [facility] in [place], [place] (I added to patients chart).

## 2023-08-10 ENCOUNTER — OFFICE VISIT (OUTPATIENT)
Age: 83
End: 2023-08-10
Payer: COMMERCIAL

## 2023-08-10 VITALS — BODY MASS INDEX: 32.18 KG/M2 | WEIGHT: 205 LBS | HEIGHT: 67 IN

## 2023-08-10 DIAGNOSIS — L82.1 SEBORRHEIC KERATOSIS: ICD-10-CM

## 2023-08-10 DIAGNOSIS — D18.01 CHERRY ANGIOMA: ICD-10-CM

## 2023-08-10 DIAGNOSIS — Z13.89 SCREENING FOR SKIN CONDITION: Primary | ICD-10-CM

## 2023-08-10 DIAGNOSIS — I87.2 STASIS DERMATITIS OF BOTH LEGS: ICD-10-CM

## 2023-08-10 DIAGNOSIS — D48.9 NEOPLASM OF UNCERTAIN BEHAVIOR: ICD-10-CM

## 2023-08-10 DIAGNOSIS — L30.9 DERMATITIS: ICD-10-CM

## 2023-08-10 DIAGNOSIS — D22.9 MULTIPLE NEVI: ICD-10-CM

## 2023-08-10 DIAGNOSIS — Z85.828 HISTORY OF SKIN CANCER: ICD-10-CM

## 2023-08-10 PROCEDURE — 99214 OFFICE O/P EST MOD 30 MIN: CPT | Performed by: DERMATOLOGY

## 2023-08-10 PROCEDURE — 88305 TISSUE EXAM BY PATHOLOGIST: CPT | Performed by: STUDENT IN AN ORGANIZED HEALTH CARE EDUCATION/TRAINING PROGRAM

## 2023-08-10 PROCEDURE — 11102 TANGNTL BX SKIN SINGLE LES: CPT | Performed by: DERMATOLOGY

## 2023-08-10 RX ORDER — AMLODIPINE BESYLATE 5 MG/1
5 TABLET ORAL DAILY
COMMUNITY
Start: 2023-07-10

## 2023-08-10 RX ORDER — APIXABAN 2.5 MG/1
2.5 TABLET, FILM COATED ORAL 2 TIMES DAILY
COMMUNITY
Start: 2023-06-18

## 2023-08-10 RX ORDER — ALLOPURINOL 100 MG/1
100 TABLET ORAL DAILY
COMMUNITY
Start: 2023-07-25

## 2023-08-10 RX ORDER — CITALOPRAM HYDROBROMIDE 10 MG/1
0.5 TABLET ORAL DAILY
COMMUNITY
Start: 2023-04-19

## 2023-08-10 RX ORDER — ERYTHROMYCIN 5 MG/G
OINTMENT OPHTHALMIC
COMMUNITY
Start: 2023-02-23

## 2023-08-10 NOTE — PATIENT INSTRUCTIONS
Dr. Hany Muhammad Shave/Punch Biopsy After Care Instructions      Remove bandage the next day. Keep bandage dry. Shower or Bathe as usual the next day. Cleanse the area once daily with saline or hydrogen peroxide. Apply Vaseline. WE ADVISE YOU NOT TO USE NEOSPORIN OR ANY TOPICAL ANTIBIOTIC UNLESS INSTRUCTED BY THE DOCTOR. Cover area with a dressing or Band-Aid if possible. Continue treatment until completely healed. (Skin appears in pink). Try to avoid scab formation. Slight bleeding may occur after the Band-Aid/Dressing is removed or the first few days after the procedure was done. Don't panic!! Apply continuous, direct pressure on the dressing over the wound for 15-20 minutes. DO NOT remove dressing. HINT:  Set a timer for 15-20 minutes to make sure you press on the wound long enough  SOAKING: the dressing before you remove it should decrease chances of bleeding. If the wound is on the legs or arms, swelling may occur. Elevating the arm or leg above the level of the heart as much as possible will also decrease swelling, promote healing and decrease chances of bleeding. ANY QUESTION PLEASE CALL OUR OFFICE AT ([phone] IF AFTER HOURS, THE ANSWERING SERVICE WILL GET A HOLD OF THE DOCTOR. PLEASE BE ADVISED THAT BIOPSY RESULTS CAN TAKE UP TO 1 TO 2 WEEKS. YOU WILL RECEIVE THE RESULTS IN [place] FIRST, BUT PLEASE WAIT FOR THE DOCTOR OR STAFF TO NOTIFY YOU.       [address] YOU!!      CONTACT DERMATITIS    Assessment and Plan:  Based on a thorough discussion of this condition and the management approach to it (including a comprehensive discussion of the known risks, side effects and potential benefits of treatment), the patient (family) agrees to implement the following specific plan:  Triamcinolone Ointment twice a day  Speak to PROVIDER for possible rx of Gabapentin      Allergic Contact Dermatitis is a delayed hypersensitivity reaction that can require 10-14 days after initial exposure to an allergen. The reaction may occur within 48-72 hours during subsequent exposures. The mechanism involved activation of CD4+ T-lymphocytes which recognize an antigen on the skin surface with immune response. Some common allergens include nickel, poison ivy, acrylates in nail cosmetics and topical antibiotics. In some cases, patients may have been in contact with the allergen for years before developing allergic contact dermatitis. Similar to irritant contact dermatitis, patients with atopic dermatitis are at higher risk. The main symptom of ACD is itching and scaly edematous plaques with some blistering in areas of exposure. Other characteristics include:  "Rhus" contact dermatitis from poison ivy can cause swelling and redness on the face in children as they play lower to the ground. It is often mistaken for cellulitis, but is differentiated by primarily itchiness without pain. Children can get ACD in their groin and buttocks due to diapering and toilet training. Baby wipes may contain chemicals or fragrances that can irritate the skin. Toilet seat cleansers can also be a culprit    The diagnosis of contact dermatitis can usually be identified after a thorough history and physical examination. Some signs to look out for include: The rash is eczematous with surface changes such as blistering, dryness, peeling, and reness  The rash occurs in areas in contact with the suspected allergen   Systemic contact dermatitis may follow ingestion of substances that previously caused allergic contact dermatitis. The rash is symmetrical and often affects the inner elbows  Patch testing can be used to determine potential contact allergens in severe or persistent cases of contact dermatitis  Open application testing can also be used to confirm a contact allergy to cosmetics and moisturizers    The most important part of treatment is to avoid skin contact with the offending agent.  Other steps you can take include:  Wear personal protective equipment like gloves and gowns when working with potentially irritating substances  Use less soap for hand washing throughout the day. Consider using hand  instead   Most patients will only need minor supportive care in the form of topical steroids for localized involvement and topical or oral antihistamines  Oatmeal soaks and calamine lotion may help soothe open erosions from scratching or blistering  Wet dressings are helpful if there is extensive oozing and crusting  In more severe cases, we can use oral prednisone for 2-3 weeks. It is important to take the steroid for the entire time period as short bursts may cause relapse. If allergic contact dermatitis is due to rhus dermatitis on the face from poison ivy, topical desonide can be used for 1-2 weeks. Also be sure to thoroughly clean any clothing items that may have come into contact with the allergen as it may cause further skin reactions  Phototherapy and immunosuppressive agents such as methotrexate, ciclosporin, or azathioprine may also be prescribed    STATIS DERMATITIS ("VENUS ECZEMA")    Assessment and Plan:  Based on a thorough discussion of this condition and the management approach to it (including a comprehensive discussion of the known risks, side effects and potential benefits of treatment), the patient (family) agrees to implement the following specific plan:  Continue with Triamcinolone Ointment twice a day    What is venous eczema? Venous eczema is a common form of eczema/dermatitis that affects one or both lower legs in association with venous insufficiency. It is also called gravitational dermatitis. Who gets venous eczema? Venous eczema is most often seen in middle-aged and elderly patients -- it is reported to affect 20% of those over 70 years.  It is associated with:  History of deep venous thrombosis in an affected limb  History of cellulitis in an affected limb  Chronic swelling of the lower leg, aggravated by hot weather and prolonged standing  Varicose veins  Venous leg ulcers. What causes venous eczema? Venous eczema appears to be due to fluid collecting in the tissues and activation of the innate immune response. Normally during walking the leg muscles pump blood upwards and valves in the veins prevent pooling. A clot in the deep leg veins (deep venous thrombosis or DVT) or varicose veins may damage the valves. As a result back pressure develops and fluid collects in the tissues. An inflammatory reaction occurs. What are the clinical features of venous eczema? Venous eczema can form discrete patches or become confluent and circumferential. Features include:  Itchy red, blistered and crusted plaques; or dry fissured and scaly plaques on one or both lower legs  Orange-brown macular pigmentation due to haemosiderin deposition  Atrophie jay (white irregular scars surrounded by red spots)  'Champagne bottle' shape of the lower leg -- narrowing at the ankles and induration (lipodermatosclerosis)    What are the complications of venous eczema? Impetiginisation -- secondary infection with Staphylococcus aureus resulting in yellowish crusts  Cellulitis -- infection with Streptococcus pyogenes: there may be redness, swelling, pain, fever, a red streak up the leg and swollen nodes in the groin  Secondary eczema -- the eczema spreads to other areas on the body  Contact allergy to one or more components of the ointments or creams used    How is venous eczema diagnosed? The diagnosis of venous eczema is clinical.  Patch tests may be undertaken if there is suspicion of contact allergy. What is the treatment for venous eczema? Reduce swelling in the leg  Don't stand for long periods. Take regular walks. Elevate your feet when sitting: if your legs are swollen they need to be above your hips to drain effectively. Elevate the foot of your bed overnight.   During the acute phase of eczema, bandaging is important to reduce swelling. When eczema has settled, wear graduated compression socks or stockings long term. Fitted moderate to high compression socks can be obtained from a surgical supplies company. Light compression using travel socks may be adequate, and these are easy to put on. They can be bought at pharmacies, travel and sports stores. More compression is obtained by wearing two pairs. Horse chestnut extract appears to be of benefit for at least some patients with venous disease. Treat the eczema  Dry up oozing patches with Condy's solution (potassium permanganate) or dilute vinegar on gauze as compresses. Oral antibiotics such as flucloxacillin may be prescribed for a secondary infection. Apply a prescribed topical steroid: start with a potent steroid cream applied accurately daily to the patches until they have flattened out. After a few days, change to a milder steroid cream (eg. hydrocortisone) until the itchy patches have resolved (maintenance treatment). Check with your doctor if you are using steroid creams for more than a few weeks. Overuse can thin the skin, but short courses of stronger preparations can be used from time to time if necessary to control dermatitis. Coal tar ointment may also help. Use a moisturizing cream frequently to keep the skin on the legs smooth and soft. If the skin is very scaly, urea cream may be especially effective. Protect your skin from injury: this can result in infection or ulceration that may be difficult to heal.    Treatment for varicose veins  Seek the opinion of a vascular surgeon regarding varicose veins  These can be treated surgically or sclerotherapy. Varicose veins may develop again after an apparently successful operation because venous disease is progressive. How can venous eczema be prevented? Venous eczema cannot be completely prevented but the number and severity of flare-ups can be reduced by the following measures.   Avoid prolonged standing or sitting with legs down  Wear compression socks or stockings  Avoid and treat leg swelling  Apply emollients frequently and regularly to dry skin  Avoid soap; use water alone or non-soap cleansers when bathing    What is the outlook for venous eczema? Venous eczema tends to be a recurring or chronic disorder lifelong. Treat recurrence promptly with topical steroids. ACTINIC KERATOSIS    Actinic keratoses are very common on sites repeatedly exposed to the sun, especially the backs of the hands and the face, most often affecting the ears, nose, cheeks, upper lip, vermilion of the lower lip, temples, forehead and balding scalp. In severely chronically sun-damaged individuals, they may also be found on the upper trunk, upper and lower limbs, and dorsum of feet. We discussed the theoretical premalignant (“pre-cancerous”) nature and etiology of these growths. We discussed the prevailing notion that actinic keratoses are a reflection of abnormal skin cell development due to DNA damage by short wavelength UVB. They are more likely to appear if the immune function is poor, due to aging, recent sun exposure, predisposing disease or certain drugs. We discussed that the main concern is that actinic keratoses may predispose to squamous cell carcinoma. It is rare for a solitary actinic keratosis to evolve to squamous cell carcinoma (SCC), but the risk of SCC occurring at some stage in a patient with more than 10 actinic keratoses is thought to be about 10 to 15%. A tender, thickened, ulcerated or enlarging actinic keratosis is suspicious of SCC. Actinic keratoses may be prevented by strict sun protection. If already present, keratoses may improve with a very high sun protection factor (50+) broad-spectrum sunscreen applied at least daily to affected areas, year-round.   We recommend that UPF-rated clothing and hats and sunglasses be worn whenever possible and that a sunscreen-moisturizer combination product such as Neutrogena Daily Defense be applied at least three times a day. We performed a thorough discussion of treatment options and specific risk/benefits/alternatives including but not limited to medical “field” treatment with medications such as the following:    Topical “field area” medications such as 5-fluorouracil or Aldara (specifically, the trouble with long-term compliance, blistering and local skin reaction versus the convenience of at-home therapy and that field therapy “gets what is not yet seen”). Cryotherapy (specifically, local pain, scarring, dyspigmentation, blistering, possible superinfection, and treats “only what we see” versus directed treatment today). Photodynamic therapy (specifically, local pain, scarring, dyspigmentation, blistering, possible superinfection, need to schedule for a later date, and time spent in the office versus field therapy that “gets what is not yet seen”). BASAL CELL CARCINOMA    What is basal cell carcinoma? Basal cell carcinoma (BCC) is a common, locally invasive, keratinocytic, or non-melanoma, skin cancer. It is also known as rodent ulcer and basalioma. Patients with BCC often develop multiple primary tumours over time. Who gets basal cell carcinoma? Risk factors for BCC include:  Age and sex: BCCs are particularly prevalent in elderly males.  However, they also affect females and younger adults   Previous BCC or other form of skin cancer (squamous cell carcinoma, melanoma)   Sun damage (photoaging, actinic keratoses)   Repeated prior episodes of sunburn   Fair skin, blue eyes and blond or red hair--note; BCC can also affect darker skin types   Previous cutaneous injury, thermal burn, disease (eg cutaneous lupus, sebaceous naevus)   Inherited syndromes: BCC is a particular problem for families with basal cell naevus syndrome (Gorlin syndrome), Pzzkn-Edbré-Zmvjmhad syndrome, Rombo syndrome, Oley syndrome and xeroderma pigmentosum Other risk factors include ionising radiation, exposure to arsenic, and immune suppression due to disease or medicines    What causes basal cell carcinoma? The cause of BCC is multifactorial.  Most often, there are DNA mutations in the patched [facility]) tumour suppressor gene, part of hedgehog signaling pathway   These may be triggered by exposure to ultraviolet radiation   Various spontaneous and inherited gene defects predispose to [facility]    What are the clinical features of basal cell carcinoma? BCC is a locally invasive skin tumour. The main characteristics are:  Slowly growing plaque or nodule   Skin coloured, pink or pigmented   Varies in size from a few millimetres to several centimetres in diameter   Spontaneous bleeding or ulceration  BCC is very rarely a threat to life. A tiny proportion of BCCs grow rapidly, invade deeply, and/or metastasise to local lymph nodes. Types of basal cell carcinoma  There are several distinct clinical types of BCC, and over 20 histological growth patterns of BCC.   Nodular BCC  Most common type of facial BCC   Shiny or pearly nodule with a smooth surface   May have central depression or ulceration, so its edges appear rolled   Blood vessels cross its surface   Cystic variant is soft, with jelly-like contents   Micronodular, microcystic and infiltrative types are potentially aggressive subtypes   Also known as nodulocystic carcinoma  Superficial BCC  Most common type in younger adults   Most common type on upper trunk and shoulders   Slightly scaly, irregular plaque   Thin, translucent rolled border   Multiple microerosions  Morphoeaform BCC  Usually found in mid-facial sites   Waxy, scar-like plaque with indistinct borders   Wide and deep subclinical extension   May infiltrate cutaneous nerves (perineural spread)   Also known as morpheic, morphoeiform or sclerosing BCC  Basosquamous carcinoma  Mixed basal cell carcinoma (BCC) and squamous cell carcinoma (SCC)   Infiltrative growth pattern   Potentially more aggressive than other forms of BCC   Also known as basisquamous carcinoma and mixed basal-squamous cell carcinoma       Complications of basal cell carcinoma    Recurrent BCC  Recurrence of BCC after initial treatment is not uncommon. Characteristics of recurrent BCC often include: Incomplete excision or narrow margins at primary excision   Morphoeic, micronodular, and infiltrative subtypes   Location on head and neck    Advanced BCC  Advanced BCCs are large, often neglected tumours. They may be several centimetres in diameter   They may be deeply infiltrating into tissues below the skin   They are difficult or impossible to treat surgically    Metastatic BCC  Very rare   Primary tumour is often large, neglected or recurrent, located on head and neck, with aggressive subtype   May have had multiple prior treatments   May arise in site exposed to ionising radiation   Can be fatal    How is basal cell carcinoma diagnosed? BCC is diagnosed clinically by the presence of a slowly enlarging skin lesion with typical appearance. The diagnosis and  by a diagnostic biopsy or following excision. Some typical superficial BCCs on trunk and limbs are clinically diagnosed and have non-surgical treatment without histology. What is the treatment for primary basal cell carcinoma? The treatment for a [address] [place] [address] depends on its type, size and location, the number to be treated, patient factors, and the preference or expertise of the doctor. Most BCCs are treated surgically. Long-term follow-up is recommended to check for new lesions and recurrence; the latter may be unnecessary if histology has reported wide clear margins. Excision biopsy  Excision means the lesion is cut out and the skin stitched up.   Most appropriate treatment for nodular, infiltrative and morphoeic BCCs   Should include 3 to 5 mm margin of normal skin around the tumour   Very large lesions may require flap or skin graft to repair the defect   Pathologist will report deep and lateral margins   Further surgery is recommended for lesions that are incompletely excised    Mohs micrographically controlled excision  Mohs micrographically controlled surgery involves examining carefully marked excised tissue under the microscope, layer by layer, to ensure complete excision. Very high cure rates achieved by trained Mohs surgeons   Used in high-risk areas of the face around eyes, lips and nose   Suitable for ill-defined, morphoeic, infiltrative and recurrent subtypes   Large defects are repaired by flap or skin graft    Superficial skin surgery  Superficial skin surgery comprises shave, curettage, and electrocautery. It is a rapid technique using local anaesthesia and does not require sutures. Suitable for small, well-defined nodular or superficial BCCs   Lesions are usually located on trunk or limbs   Wound is left open to heal by secondary intention   Moist wound dressings lead to healing within a few weeks   Eventual scar quality variable    Cryotherapy  Cryotherapy is the treatment of a superficial skin lesion by freezing it, usually with liquid nitrogen. Suitable for small superficial BCCs on covered areas of trunk and limbs   Best avoided for BCCs on head and neck, and distal to knees   Double freeze-thaw technique   Results in a blister that crusts over and heals within several weeks. Leaves permanent white rosy    Photodynamic therapy  Photodynamic therapy (PDT) refers to a technique in which [facility] is treated with a photosensitising chemical, and exposed to light several hours later.   Topical photosensitisers include aminolevulinic acid lotion and methyl aminolevulinate cream   Suitable for low-risk small, superficial BCCs   Best avoided if tumour in site at high risk of recurrence   Results in inflammatory reaction, maximal 3-4 days after procedure   Treatment repeated 7 days after initial treatment   Excellent cosmetic results    Imiquimod cream  Imiquimod is an immune response modifier. Best used for superficial BCCs less than 2 cm diameter   Applied three to five times each week, for 6-16 weeks   Results in a variable inflammatory reaction, maximal at three weeks   Minimal scarring is usual    Fluorouracil cream  5-Fluorouracil cream is a topical cytotoxic agent. Used to treat small superficial basal cell carcinomas   Requires prolonged course, eg twice daily for 6-12 weeks   Causes inflammatory reaction   Has high recurrence rates    Radiotherapy  Radiotherapy or X-ray treatment can be used to treat primary BCCs or as adjunctive treatment if margins are incomplete. Mainly used if surgery is not suitable   Best avoided in young patients and in genetic conditions predisposing to skin cancer   Best cosmetic results achieved using multiple fractions   Typically, patient attends once-weekly for several weeks   Causes inflammatory reaction followed by scar   Risk of radiodermatitis, late recurrence, and new tumours    What is the treatment for advanced or metastatic basal cell carcinoma? Locally advanced primary, recurrent or metastatic BCC requires multidisciplinary consultation. Often a combination of treatments is used. Surgery   Radiotherapy   Targeted therapy  Targeted therapy refers to the hedgehog signalling pathway inhibitors, vismodegib and sonidegib. These drugs have some important risks and side effects. How can basal cell carcinoma be prevented? The most important way to prevent BCC is to avoid sunburn. This is especially important in childhood and early life. Fair skinned individuals and those with a personal or family history of BCC should protect their skin from sun exposure daily, year-round and lifelong.   Stay indoors or under the shade in the middle of the day   Wear covering clothing   Apply high protection factor SPF50+ broad-spectrum sunscreens generously to exposed skin if outdoors   Avoid indoor tanning (sun beds, solaria)  Oral nicotinamide (vitamin B3) in a dose of 500 mg twice daily may reduce the number and severity of BCCs. What is the outlook for basal cell carcinoma? Most BCCs are cured by treatment. Cure is most likely if treatment is undertaken when the lesion is small. About 50% of people with BCC develop a second one within 3 years of the first. They are also at increased risk of other skin cancers, especially melanoma. Regular self-skin examinations and long-term annual skin checks by an experienced health professional are recommended. SQUAMOUS CELL CARCINOMA    What is cutaneous squamous cell carcinoma? Cutaneous squamous cell carcinoma (SCC) is a common type of keratinocyte or non-melanoma skin cancer. It is derived from cells within the epidermis that make keratin -- the horny protein that makes up skin, hair and nails. Cutaneous SCC is an invasive disease, referring to cancer cells that have grown beyond the epidermis. SCC can sometimes metastasise and may prove fatal.  Intraepidermal carcinoma (cutaneous SCC in situ) and mucosal SCC are considered elsewhere. Who gets cutaneous squamous cell carcinoma? Risk factors for cutaneous SCC include:  Age and sex: SCCs are particularly prevalent in elderly males. However, they also affect females and younger adults. Previous SCC or another form of skin cancer (basal cell carcinoma, melanoma) are a strong predictor for further skin cancers. Actinic keratoses   Outdoor occupation or recreation   Smoking   Fair skin, blue eyes and blond or red hair   Previous cutaneous injury, thermal burn, disease (eg cutaneous lupus, epidermolysis bullosa, leg ulcer)   Inherited syndromes: SCC is a particular problem for families with xeroderma pigmentosum and albinism   Other risk factors include ionising radiation, exposure to arsenic, and immune suppression due to disease (eg chronic lymphocytic leukaemia) or medicines.  Organ transplant recipients have a massively increased risk of developing SCC. What causes cutaneous squamous cell carcinoma? More than 90% of cases of SCC are associated with numerous DNA mutations in multiple somatic genes. Mutations in the p53 tumour suppressor gene are caused by exposure to ultraviolet radiation (UV), especially UVB (known as signature 7). Other signature mutations relate to cigarette smoking, ageing and immune suppression (eg, to drugs such as azathioprine). Mutations in signalling pathways affect the epidermal growth factor receptor, TED, Fyn, and [id number] signalling. Beta-genus human papillomaviruses (wart virus) are thought to play a role in SCC arising in immune-suppressed populations. ?-HPV and HPV subtypes 5, 8, 17, 20, 24, and 38 have also been associated with an increased risk of cutaneous SCC in immunocompetent individuals. What are the clinical features of cutaneous squamous cell carcinoma? Cutaneous SCCs present as enlarging scaly or crusted lumps. They usually arise within pre-existing actinic keratosis or intraepidermal carcinoma. They grow over weeks to months   They may ulcerate   They are often tender or painful   Located on sun-exposed sites, particularly the face, lips, ears, hands, forearms and lower legs   Size varies from a few millimetres to several centimetres in diameter. Types of cutaneous squamous cell carcinoma  Distinct clinical types of invasive cutaneous SCC include:  Cutaneous horn -- the horn is due to excessive production of keratin   Keratoacanthoma (KA) -- a rapidly growing keratinising nodule that may resolve without treatment   Carcinoma cuniculatum (‘verrucous carcinoma’), a slow-growing, warty tumour on the sole of the foot.    Multiple eruptive SCC/KA-like lesions arising in syndromes, such as multiple self-healing squamous epitheliomas of Hurt-Smith and Grzybowski syndrome  The pathologist may classify a tumour as well differentiated, moderately well differentiated, poorly differentiated or anaplastic cutaneous SCC. There are other variants. Classification of squamous cell carcinoma by risk  Cutaneous SCC is classified as low-risk or high-risk, depending on the chance of tumour recurrence and metastasis. Characteristics of high-risk SCC include:  High-risk cutaneous squamous cell carcinoma has the following characteristics:  Diameter greater than or equal to 2 cm   Location on the ear, vermilion of the lip, central face, hands, feet, genitalia   Arising in elderly or immune suppressed patient   Histological thickness greater than 2 mm, poorly differentiated histology, or with the invasion of the subcutaneous tissue, nerves and blood vessels  Metastatic SCC is found in regional lymph nodes (80%), lungs, liver, brain, bones and skin. Staging cutaneous squamous cell carcinoma  In 2011, the [address] on Cancer (AJCC) published a new staging systemic for cutaneous SCC for the 7th Edition of the AJCC manual. This evaluates the dimensions of the original primary tumour (T) and its metastases to lymph nodes (N). Tumour staging for cutaneous SCC  TX: Th Primary tumour cannot be assessed  T0: No evidence of a primary tumour  Tis: Carcinoma in situ  T1: Tumour ? 2cm without high-risk features  T2: Tumour ? 2cm; or; Tumour ? 2 cm with high-risk features  T3: Tumour with the invasion of maxilla, mandible, orbit or temporal bone  T4: Tumour with the invasion of axial or appendicular skeleton or perineural invasion of skull base    Gordon staging for cutaneous SCC  NX: Regional lymph nodes cannot be assessed  N0: No regional lymph node metastasis  N1: Metastasis in one local lymph node ? 3cm  N2: Metastasis in one local lymph node ? 3cm; or; Metastasis in >1 local lymph node ? 6cm  N3: Metastasis in lymph node ? 6cm    How is squamous cell carcinoma diagnosed? Diagnosis of cutaneous SCC is based on clinical features.  The diagnosis and histological subtype are confirmed pathologically by diagnostic biopsy or following excision. See squamous cell carcinoma - pathology. Patients with high-risk SCC may also undergo staging investigations to determine whether it has spread to lymph nodes or elsewhere. These may include:  Imaging using ultrasound scan, X-rays, CT scans, MRI scans   Lymph node or other tissue biopsies    What is the treatment for cutaneous squamous cell carcinoma? Cutaneous SCC is nearly always treated surgically. Most cases are excised with a 3-10 mm margin of normal tissue around a visible tumour. A flap or skin graft may be needed to repair the defect. Other methods of removal include:  Shave, curettage, and electrocautery for low-risk tumours on trunk and limbs   Aggressive cryotherapy for very small, thin, low-risk tumours   Mohs micrographic surgery for large facial lesions with indistinct margins or recurrent tumours   Radiotherapy for an inoperable tumour, patients unsuitable for surgery, or as adjuvant    What is the treatment for advanced or metastatic squamous cell carcinoma? Locally advanced primary, recurrent or metastatic SCC requires multidisciplinary consultation. Often a combination of treatments is used. Surgery   Radiotherapy   Cemiplimab   Experimental targeted therapy using epidermal growth factor receptor inhibitors    How can cutaneous squamous cell carcinoma be prevented? There is a great deal of evidence to show that very careful sun protection at any time of life reduces the number of SCCs. This is particularly important in ageing, sun-damaged, fair skin; in patients that are immune suppressed; and in those who already have actinic keratoses or previous SCC.   Stay indoors or under the shade in the middle of the day   Wear covering clothing   Apply high protection factor SPF50+ broad-spectrum sunscreens generously to exposed skin if outdoors   Avoid indoor tanning (sun beds, solaria)    Oral nicotinamide (vitamin B3) in a dose of 500 mg twice daily may reduce the number and severity of SCCs in people at high risk. Patients with multiple squamous cell carcinomas may be prescribed an oral retinoid (acitretin or isotretinoin). These reduce the number of tumours but have some nuisance side effects. What is the outlook for cutaneous squamous cell carcinoma? Most SCCs are cured by treatment. A cure is most likely if treatment is undertaken when the lesion is small. The risk of recurrence or disease-associated death is greater for tumours that are > 20 mm in diameter and/or > 2 mm in thickness at the time of surgical excision. About 50% of people at high risk of SCC develop a second one within 5 years of the first. They are also at increased risk of other skin cancers, especially melanoma. Regular self-skin examinations and long-term annual skin checks by an experienced health professional are recommended.

## 2023-08-10 NOTE — PROGRESS NOTES
[facility] Note     Patient Name: Narinder Ruvalcaba  Encounter Date: August 10, 2023     Have you been cared for by a [facility] Dermatologist in the last 3 years and, if so, which description applies to you? Yes. I have been here within the last 3 years, and my medical history has NOT changed since that time. I am MALE/not capable of bearing children. REVIEW OF SYSTEMS:  Have you recently had or currently have any of the following? · No changes in my recent health. PAST MEDICAL HISTORY:  Have you personally ever had or currently have any of the following? If "YES," then please provide more detail. · No changes in my medical history. FAMILY HISTORY:  Any "first degree relatives" (parent, brother, sister, or child) with the following? • No changes in my family's known health. PATIENT EXPERIENCE:    • Do you want the Dermatologist to perform a COMPLETE skin exam today including a clinical examination under the "bra and underwear" areas? Yes  • If necessary, do we have your permission to call and leave a detailed message on your Preferred Phone number that includes your specific medical information?   Yes      No Known Allergies   Current Outpatient Medications:   •  allopurinol (ZYLOPRIM) 100 mg tablet, Take 100 mg by mouth daily, Disp: , Rfl:   •  amLODIPine (NORVASC) 5 mg tablet, Take 5 mg by mouth daily, Disp: , Rfl:   •  cholecalciferol (VITAMIN D3) 1,000 units tablet, Take by mouth, Disp: , Rfl:   •  ciprofloxacin (Ciloxan) 0.3 % ophthalmic ointment, Apply 1 Application to eye 4 (four) times a day, Disp: , Rfl:   •  citalopram (CeleXA) 10 mg tablet, Take 0.5 tablets by mouth daily, Disp: , Rfl:   •  Eliquis 2.5 MG, Take 2.5 mg by mouth 2 (two) times a day, Disp: , Rfl:   •  erythromycin (ILOTYCIN) ophthalmic ointment, Apply to your stitches as well as in the LEFT eye 4 times a day., Disp: , Rfl:   •  indomethacin (INDOCIN) 25 mg capsule, Take by mouth, Disp: , Rfl:   • lisinopril (ZESTRIL) 20 mg tablet, Take 20 mg by mouth , Disp: , Rfl:   •  triamcinolone (KENALOG) 0.1 % ointment, APPLY TOPICALLY TWICE A DAY TO LEGS AS NEEDED, Disp: 80 g, Rfl: 3  •  triamterene-hydrochlorothiazide (MAXZIDE-25) 37.5-25 mg per tablet, Take by mouth, Disp: , Rfl:   •  clobetasol (TEMOVATE) 0.05 % ointment, Apply topically 2 (two) times a day (Patient not taking: Reported on 8/10/2023), Disp: , Rfl:   •  tamsulosin (FLOMAX) 0.4 mg, Take by mouth (Patient not taking: Reported on 8/10/2023), Disp: , Rfl:           • Whom besides the patient is providing clinical information about today's encounter?   o NO ADDITIONAL HISTORIAN (patient alone provided history)    Physical Exam and Assessment/Plan by Diagnosis:    Chief complaint: Patient is a 82 y/o male present for a routine skin exam, pt has a history of skin cancer. States he just completed 33 treatments on radiation and still doing immunotherapy for squamous cell cancer. In October new scans will be done to determine efficacy of treatment. NEOPLASM OF UNCERTAIN BEHAVIOR OF SKIN    Physical Exam:  • (Anatomic Location); (Size and Morphological Description); (Differential Diagnosis):  o Specimen A; occiput of scalp; 6 mm hyperkeratotic papule; Diff. Dx.:  rule out squamous cell cancer  • Pertinent Positives:  • Pertinent Negatives:  •   •   •     Additional History of Present Condition:  Present on exam    Assessment and Plan:  • I have discussed with the patient that a sample of skin via a "skin biopsy” would be potentially helpful to further make a specific diagnosis under the microscope.   • Based on a thorough discussion of this condition and the management approach to it (including a comprehensive discussion of the known risks, side effects and potential benefits of treatment), the patient (family) agrees to implement the following specific plan:  • If biopsy if positive for skin cancer - will refer to Dr. Ady Preston for treatment    o Procedure: Skin Biopsy. After a thorough discussion of treatment options and risk/benefits/alternatives (including but not limited to local pain, scarring, dyspigmentation, blistering, possible superinfection, and inability to confirm a diagnosis via histopathology), verbal and written consent were obtained and portion of the rash was biopsied for tissue sample. See below for consent that was obtained from patient and subsequent Procedure Note. PROCEDURE TANGENTIAL (SHAVE) BIOPSY NOTE:    • Performing Physician:   • Anatomic Location; Clinical Description with size (cm); Pre-Op Diagnosis:    Specimen A; occiput of scalp; 6 mm hyperkeratotic papule; Diff. Dx.:  rule out squamous cell cancer  • Post-op diagnosis: Same     • Local anesthesia: 1% xylocaine with epi      • Topical anesthesia: None    • Hemostasis: Aluminum chloride       After obtaining informed consent  at which time there was a discussion about the purpose of biopsy  and low risks of infection and bleeding. The area was prepped and draped in the usual fashion. Anesthesia was obtained with 1% lidocaine with epinephrine. A shave biopsy to an appropriate sampling depth was obtained by Shave (Dermablade or 15 blade) The resulting wound was covered with surgical ointment and bandaged appropriately. The patient tolerated the procedure well without complications and was without signs of functional compromise. Specimen has been sent for review by Dermatopathology. Standard post-procedure care has been explained and has been included in written form within the patient's copy of Informed Consent. INFORMED CONSENT DISCUSSION AND POST-OPERATIVE INSTRUCTIONS FOR PATIENT    I.  RATIONALE FOR PROCEDURE  I understand that a skin biopsy allows the Dermatologist to test a lesion or rash under the microscope to obtain a diagnosis.   It usually involves numbing the area with numbing medication and removing a small piece of skin; sometimes the area will be closed with sutures. In this specific procedure, sutures are not usually needed. If any sutures are placed, then they are usually need to be removed in 2 weeks or less. I understand that my Dermatologist recommends that a skin "shave" biopsy be performed today. A local anesthetic, similar to the kind that a dentist uses when filling a cavity, will be injected with a very small needle into the skin area to be sampled. The injected skin and tissue underneath "will go to sleep” and become numb so no pain should be felt afterwards. An instrument shaped like a tiny "razor blade" (shave biopsy instrument) will be used to cut a small piece of tissue and skin from the area so that a sample of tissue can be taken and examined more closely under the microscope. A slight amount of bleeding will occur, but it will be stopped with direct pressure and a pressure bandage and any other appropriate methods. I understands that a scar will form where the wound was created. Surgical ointment will be applied to help protect the wound. Sutures are not usually needed. II.  RISKS AND POTENTIAL COMPLICATIONS   I understand the risks and potential complications of a skin biopsy include but are not limited to the following:  • Bleeding  • Infection  • Pain  • Scar/keloid  • Skin discoloration  • Incomplete Removal  • Recurrence  • Nerve Damage/Numbness/Loss of Function  • Allergic Reaction to Anesthesia  • Biopsies are diagnostic procedures and based on findings additional treatment or evaluation may be required  • Loss or destruction of specimen resulting in no additional findings    My Dermatologist has explained to me the nature of the condition, the nature of the procedure, and the benefits to be reasonably expected compared with alternative approaches.   My Dermatologist has discussed the likelihood of major risks or complications of this procedure including the specific risks listed above, such as bleeding, infection, and scarring/keloid. I understand that a scar is expected after this procedure. I understand that my physician cannot predict if the scar will form a "keloid," which extends beyond the borders of the wound that is created. A keloid is a thick, painful, and bumpy scar. A keloid can be difficult to treat, as it does not always respond well to therapy, which includes injecting cortisone directly into the keloid every few weeks. While this usually reduces the pain and size of the scar, it does not eliminate it. I understand that photographs may be taken before and after the procedure. These will be maintained as part of the medical providers confidential records and may not be made available to me. I further authorize the medical provider to use the photographs for teaching purposes or to illustrate scientific papers, books, or lectures if in his/her judgment, medical research, education, or science may benefit from its use. I have had an opportunity to fully inquire about the risks and benefits of this procedure and its alternatives. I have been given ample time and opportunity to ask questions and to seek a second opinion if I wished to do so. I acknowledge that there have specifically been no guarantees as to the cosmetic results from the procedure. I am aware that with any procedure there is always the possibility of an unexpected complication. III. POST-PROCEDURAL CARE (WHAT YOU WILL NEED TO DO "AFTER THE BIOPSY" TO OPTIMIZE HEALING)    • Keep the area clean and dry. Try NOT to remove the bandage or get it wet for the first 24 hours. • Gently clean the area and apply surgical ointment (such as Vaseline petrolatum ointment, which is available "over the counter" and not a prescription) to the biopsy site for up to 2 weeks straight. This acts to protect the wound from the outside world. • Sutures are not usually placed in this procedure.   If any sutures were placed, return for suture removal as instructed (generally 1 week for the face, 2 weeks for the body). • Take Acetaminophen (Tylenol) for discomfort, if no contraindications. Ibuprofen or aspirin could make bleeding worse. • Call our office immediately for signs of infection: fever, chills, increased redness, warmth, tenderness, discomfort/pain, or pus or foul smell coming from the wound. WHAT TO DO IF THERE IS ANY BLEEDING? If a small amount of bleeding is noticed, place a clean cloth over the area and apply firm pressure for ten minutes. Check the wound after 10 minutes of direct pressure. If bleeding persists, try one more time for an additional 10 minutes of direct pressure on the area. If the bleeding becomes heavier or does not stop after the second attempt, or if you have any other questions about this procedure, then please call your [facility] ke's Dermatologist by calling [phone] (SKIN). I hereby acknowledge that I have reviewed and verified the site with my Dermatologist and have requested and authorized my Dermatologist to proceed with the procedure.       HISTORY OF SQUAMOUS CELL CARCINOMA     Physical Exam:  • Anatomic Location Affected:  Infraorbital Area  • Morphological Description of Scar: Well healed scar, finished 33 treatments of radiation and currently on Immunotherapy  • Suspected Recurrence: no  • Regional adenopathy: no    Additional History of Present Condition:  Currently being treated    Assessment and Plan:  Based on a thorough discussion of this condition and the management approach to it (including a comprehensive discussion of the known risks, side effects and potential benefits of treatment), the patient (family) agrees to implement the following specific plan:  • October new scans to be done to determine efficacy of treatments      MELANOCYTIC NEVI ("Moles")    Physical Exam:  • Anatomic Location Affected: Mostly on sun-exposed areas of the trunk and extremities  • Morphological Description:  Scattered, 1-4mm round to ovoid, symmetrical-appearing, even bordered, skin colored to dark brown macules/papules, mostly in sun-exposed areas  • Pertinent Positives:  • Pertinent Negatives: Additional History of Present Condition:  Present on exam    Assessment and Plan:  Based on a thorough discussion of this condition and the management approach to it (including a comprehensive discussion of the known risks, side effects and potential benefits of treatment), the patient (family) agrees to implement the following specific plan:  • Provided handout with information regarding the ABCDE's of moles   • Recommend routine skin exams every 6 months   • Sun avoidance, protective clothing (known as UPF clothing), and the use of at least SPF 30 sunscreens is advised. Sunscreen should be reapplied every two hours when outside. SEBORRHEIC KERATOSIS; NON-INFLAMED    Physical Exam:  • Anatomic Location Affected:  scattered across trunk, extremities, face  • Morphological Description:  Flat and raised, waxy, smooth to warty textured, yellow to brownish-grey to dark brown to blackish, discrete, "stuck-on" appearing papules. • Pertinent Positives:  • Pertinent Negatives: Additional History of Present Condition:  Patient reports new bumps on the skin. Denies itch, burn, pain, bleeding or ulceration. Present constantly; nothing seems to make it worse or better. No prior treatment.       Assessment and Plan:  Based on a thorough discussion of this condition and the management approach to it (including a comprehensive discussion of the known risks, side effects and potential benefits of treatment), the patient (family) agrees to implement the following specific plan:  • Reassured benign      ANGIOMA ("CHERRY ANGIOMA")    Physical Exam:  • Anatomic Location: scattered across sun exposed areas of the trunk and extremities   • Morphologic Description: Firm red to reddish-blue discrete papules  • Pertinent Positives:  • Pertinent Negatives: Additional History of Present Condition:  Present on exam.     Assessment and Plan:  • Reassured benign    CONTACT DERMATITIS    Physical Exam:  • Anatomic Location Affected:  Back  • Morphological Description:  Erythematous and itchy patches  • Severity: moderate  • Pertinent Positives:  • Pertinent Negatives: Additional History of Present Condition:  Present on exam    Assessment and Plan:  Based on a thorough discussion of this condition and the management approach to it (including a comprehensive discussion of the known risks, side effects and potential benefits of treatment), the patient (family) agrees to implement the following specific plan:  • Triamcinolone Ointment twice a day  • Speak to PROVIDER for possible rx of Gabapentin      Allergic Contact Dermatitis is a delayed hypersensitivity reaction that can require 10-14 days after initial exposure to an allergen. The reaction may occur within 48-72 hours during subsequent exposures. The mechanism involved activation of CD4+ T-lymphocytes which recognize an antigen on the skin surface with immune response. Some common allergens include nickel, poison ivy, acrylates in nail cosmetics and topical antibiotics. In some cases, patients may have been in contact with the allergen for years before developing allergic contact dermatitis. Similar to irritant contact dermatitis, patients with atopic dermatitis are at higher risk. The main symptom of ACD is itching and scaly edematous plaques with some blistering in areas of exposure. Other characteristics include:  • "Rhus" contact dermatitis from poison ivy can cause swelling and redness on the face in children as they play lower to the ground. It is often mistaken for cellulitis, but is differentiated by primarily itchiness without pain. • Children can get ACD in their groin and buttocks due to diapering and toilet training.  Baby wipes may contain chemicals or fragrances that can irritate the skin. Toilet seat cleansers can also be a culprit    The diagnosis of contact dermatitis can usually be identified after a thorough history and physical examination. Some signs to look out for include:  • The rash is eczematous with surface changes such as blistering, dryness, peeling, and reness  • The rash occurs in areas in contact with the suspected allergen   • Systemic contact dermatitis may follow ingestion of substances that previously caused allergic contact dermatitis. The rash is symmetrical and often affects the inner elbows  • Patch testing can be used to determine potential contact allergens in severe or persistent cases of contact dermatitis  • Open application testing can also be used to confirm a contact allergy to cosmetics and moisturizers    The most important part of treatment is to avoid skin contact with the offending agent. Other steps you can take include:  • Wear personal protective equipment like gloves and gowns when working with potentially irritating substances  • Use less soap for hand washing throughout the day. Consider using hand  instead   • Most patients will only need minor supportive care in the form of topical steroids for localized involvement and topical or oral antihistamines  • Oatmeal soaks and calamine lotion may help soothe open erosions from scratching or blistering  • Wet dressings are helpful if there is extensive oozing and crusting  • In more severe cases, we can use oral prednisone for 2-3 weeks. It is important to take the steroid for the entire time period as short bursts may cause relapse. • If allergic contact dermatitis is due to rhus dermatitis on the face from poison ivy, topical desonide can be used for 1-2 weeks.  Also be sure to thoroughly clean any clothing items that may have come into contact with the allergen as it may cause further skin reactions  • Phototherapy and immunosuppressive agents such as methotrexate, ciclosporin, or azathioprine may also be prescribed    STATIS DERMATITIS ("VENUS ECZEMA")    Physical Exam:  • Anatomic Location Affected:  Bilateral Lower Legs  • Morphological Description:  Scaly, erythematous patches  • Pertinent Positives:  • Pertinent Negatives: Additional History of Present Condition:  Using Triamcinolone Ointment    Assessment and Plan:  Based on a thorough discussion of this condition and the management approach to it (including a comprehensive discussion of the known risks, side effects and potential benefits of treatment), the patient (family) agrees to implement the following specific plan:  • Continue with Triamcinolone Ointment twice a day    What is venous eczema? Venous eczema is a common form of eczema/dermatitis that affects one or both lower legs in association with venous insufficiency. It is also called gravitational dermatitis. Who gets venous eczema? Venous eczema is most often seen in middle-aged and elderly patients -- it is reported to affect 20% of those over 70 years. It is associated with:  • History of deep venous thrombosis in an affected limb  • History of cellulitis in an affected limb  • Chronic swelling of the lower leg, aggravated by hot weather and prolonged standing  • Varicose veins  • Venous leg ulcers. What causes venous eczema? Venous eczema appears to be due to fluid collecting in the tissues and activation of the innate immune response. Normally during walking the leg muscles pump blood upwards and valves in the veins prevent pooling. A clot in the deep leg veins (deep venous thrombosis or DVT) or varicose veins may damage the valves. As a result back pressure develops and fluid collects in the tissues. An inflammatory reaction occurs. What are the clinical features of venous eczema?   Venous eczema can form discrete patches or become confluent and circumferential. Features include:  • Itchy red, blistered and crusted plaques; or dry fissured and scaly plaques on one or both lower legs  • Orange-brown macular pigmentation due to haemosiderin deposition  • Atrophie jay (white irregular scars surrounded by red spots)  • 'Champagne bottle' shape of the lower leg -- narrowing at the ankles and induration (lipodermatosclerosis)    What are the complications of venous eczema? • Impetiginisation -- secondary infection with Staphylococcus aureus resulting in yellowish crusts  • Cellulitis -- infection with Streptococcus pyogenes: there may be redness, swelling, pain, fever, a red streak up the leg and swollen nodes in the groin  • Secondary eczema -- the eczema spreads to other areas on the body  • Contact allergy to one or more components of the ointments or creams used    How is venous eczema diagnosed? The diagnosis of venous eczema is clinical.  Patch tests may be undertaken if there is suspicion of contact allergy. What is the treatment for venous eczema? Reduce swelling in the leg  • Don't stand for long periods. • Take regular walks. • Elevate your feet when sitting: if your legs are swollen they need to be above your hips to drain effectively. • Elevate the foot of your bed overnight. • During the acute phase of eczema, bandaging is important to reduce swelling. • When eczema has settled, wear graduated compression socks or stockings long term. Fitted moderate to high compression socks can be obtained from a surgical supplies company. Light compression using travel socks may be adequate, and these are easy to put on. They can be bought at pharmacies, travel and sports stores. More compression is obtained by wearing two pairs. • Horse chestnut extract appears to be of benefit for at least some patients with venous disease. Treat the eczema  • Dry up oozing patches with Condy's solution (potassium permanganate) or dilute vinegar on gauze as compresses.   • Oral antibiotics such as flucloxacillin may be prescribed for a secondary infection. • Apply a prescribed topical steroid: start with a potent steroid cream applied accurately daily to the patches until they have flattened out. After a few days, change to a milder steroid cream (eg. hydrocortisone) until the itchy patches have resolved (maintenance treatment). Check with your doctor if you are using steroid creams for more than a few weeks. Overuse can thin the skin, but short courses of stronger preparations can be used from time to time if necessary to control dermatitis. Coal tar ointment may also help. • Use a moisturizing cream frequently to keep the skin on the legs smooth and soft. If the skin is very scaly, urea cream may be especially effective. • Protect your skin from injury: this can result in infection or ulceration that may be difficult to heal.    Treatment for varicose veins  • Seek the opinion of a vascular surgeon regarding varicose veins  • These can be treated surgically or sclerotherapy. • Varicose veins may develop again after an apparently successful operation because venous disease is progressive. How can venous eczema be prevented? Venous eczema cannot be completely prevented but the number and severity of flare-ups can be reduced by the following measures. • Avoid prolonged standing or sitting with legs down  • Wear compression socks or stockings  • Avoid and treat leg swelling  • Apply emollients frequently and regularly to dry skin  • Avoid soap; use water alone or non-soap cleansers when bathing    What is the outlook for venous eczema? Venous eczema tends to be a recurring or chronic disorder lifelong. Treat recurrence promptly with topical steroids.       Scribe Attestation    I,:  Karena Manrique am acting as a scribe while in the presence of the attending physician.:       I,:  Haim Valentino MD personally performed the services described in this documentation    as scribed in my presence.:

## 2023-08-16 PROCEDURE — 88305 TISSUE EXAM BY PATHOLOGIST: CPT | Performed by: STUDENT IN AN ORGANIZED HEALTH CARE EDUCATION/TRAINING PROGRAM

## 2023-08-23 ENCOUNTER — TELEPHONE (OUTPATIENT)
Age: 83
End: 2023-08-23

## 2023-08-23 NOTE — TELEPHONE ENCOUNTER
Dr Mack Dandy made several attempts to contact patient, patient did not returned any of his phone calls. Letter sent.      Elaine Adorno/reyna  08/23/23  4:37 PM

## 2023-10-27 ENCOUNTER — TELEPHONE (OUTPATIENT)
Age: 83
End: 2023-10-27

## 2023-10-27 NOTE — TELEPHONE ENCOUNTER
Gaby Espinosa from Dr. Hollie Ayala called again requesting a photo from patient's scalp to be faxed ASAP .    [employer] fax number is :[phone] or [phone]

## 2023-10-27 NOTE — TELEPHONE ENCOUNTER
Patient is at Dr. Carlos Jackson office for [facility] surgery. But they are requesting that the photo inmedia, of the patients scalp can be faxed to their office ASAP.  Please fax to [phone]

## 2024-02-21 PROBLEM — Z13.89 SCREENING FOR SKIN CONDITION: Status: RESOLVED | Noted: 2018-02-21 | Resolved: 2024-02-21

## 2024-04-14 DIAGNOSIS — I87.2 STASIS DERMATITIS OF BOTH LEGS: ICD-10-CM

## 2024-04-15 RX ORDER — TRIAMCINOLONE ACETONIDE 1 MG/G
OINTMENT TOPICAL
Qty: 454 G | Refills: 0 | Status: SHIPPED | OUTPATIENT
Start: 2024-04-15